# Patient Record
Sex: MALE | Race: OTHER | HISPANIC OR LATINO | URBAN - METROPOLITAN AREA
[De-identification: names, ages, dates, MRNs, and addresses within clinical notes are randomized per-mention and may not be internally consistent; named-entity substitution may affect disease eponyms.]

---

## 2019-04-29 ENCOUNTER — INPATIENT (INPATIENT)
Facility: HOSPITAL | Age: 34
LOS: 1 days | Discharge: HOME | DRG: 247 | End: 2019-05-01
Attending: INTERNAL MEDICINE | Admitting: INTERNAL MEDICINE
Payer: COMMERCIAL

## 2019-04-29 VITALS
OXYGEN SATURATION: 98 % | TEMPERATURE: 99 F | RESPIRATION RATE: 16 BRPM | HEART RATE: 106 BPM | SYSTOLIC BLOOD PRESSURE: 162 MMHG | HEIGHT: 70 IN | WEIGHT: 190.04 LBS | DIASTOLIC BLOOD PRESSURE: 103 MMHG

## 2019-04-29 DIAGNOSIS — R03.0 ELEVATED BLOOD-PRESSURE READING, WITHOUT DIAGNOSIS OF HYPERTENSION: ICD-10-CM

## 2019-04-29 DIAGNOSIS — R63.8 OTHER SYMPTOMS AND SIGNS CONCERNING FOOD AND FLUID INTAKE: ICD-10-CM

## 2019-04-29 DIAGNOSIS — F19.10 OTHER PSYCHOACTIVE SUBSTANCE ABUSE, UNCOMPLICATED: ICD-10-CM

## 2019-04-29 DIAGNOSIS — I21.4 NON-ST ELEVATION (NSTEMI) MYOCARDIAL INFARCTION: ICD-10-CM

## 2019-04-29 DIAGNOSIS — Z91.89 OTHER SPECIFIED PERSONAL RISK FACTORS, NOT ELSEWHERE CLASSIFIED: ICD-10-CM

## 2019-04-29 DIAGNOSIS — R07.9 CHEST PAIN, UNSPECIFIED: ICD-10-CM

## 2019-04-29 DIAGNOSIS — R00.0 TACHYCARDIA, UNSPECIFIED: ICD-10-CM

## 2019-04-29 DIAGNOSIS — Z29.9 ENCOUNTER FOR PROPHYLACTIC MEASURES, UNSPECIFIED: ICD-10-CM

## 2019-04-29 LAB
ALBUMIN SERPL ELPH-MCNC: 3.3 G/DL — LOW (ref 3.4–5)
ALP SERPL-CCNC: 37 U/L — LOW (ref 40–120)
ALT FLD-CCNC: 42 U/L — SIGNIFICANT CHANGE UP (ref 12–42)
ANION GAP SERPL CALC-SCNC: 11 MMOL/L — SIGNIFICANT CHANGE UP (ref 5–17)
ANION GAP SERPL CALC-SCNC: 5 MMOL/L — LOW (ref 9–16)
APTT BLD: 25.6 SEC — LOW (ref 27.5–36.3)
APTT BLD: 26.1 SEC — LOW (ref 27.5–36.3)
APTT BLD: 35.3 SEC — SIGNIFICANT CHANGE UP (ref 27.5–36.3)
APTT BLD: 84.3 SEC — HIGH (ref 27.5–36.3)
AST SERPL-CCNC: 38 U/L — HIGH (ref 15–37)
BASOPHILS NFR BLD AUTO: 0.5 % — SIGNIFICANT CHANGE UP (ref 0–2)
BILIRUB SERPL-MCNC: 0.2 MG/DL — SIGNIFICANT CHANGE UP (ref 0.2–1.2)
BUN SERPL-MCNC: 16 MG/DL — SIGNIFICANT CHANGE UP (ref 7–23)
BUN SERPL-MCNC: 21 MG/DL — SIGNIFICANT CHANGE UP (ref 7–23)
CALCIUM SERPL-MCNC: 8.6 MG/DL — SIGNIFICANT CHANGE UP (ref 8.5–10.5)
CALCIUM SERPL-MCNC: 9.1 MG/DL — SIGNIFICANT CHANGE UP (ref 8.4–10.5)
CHLORIDE SERPL-SCNC: 104 MMOL/L — SIGNIFICANT CHANGE UP (ref 96–108)
CHLORIDE SERPL-SCNC: 105 MMOL/L — SIGNIFICANT CHANGE UP (ref 96–108)
CHOLEST SERPL-MCNC: 155 MG/DL — SIGNIFICANT CHANGE UP (ref 10–199)
CK MB CFR SERPL CALC: 23.5 NG/ML — HIGH (ref 0–6.7)
CK MB CFR SERPL CALC: 31.1 NG/ML — HIGH (ref 0–6.7)
CK MB CFR SERPL CALC: 31.8 NG/ML — HIGH (ref 0–6.7)
CK SERPL-CCNC: 348 U/L — HIGH (ref 30–200)
CK SERPL-CCNC: 434 U/L — HIGH (ref 30–200)
CK SERPL-CCNC: 503 U/L — HIGH (ref 30–200)
CK SERPL-CCNC: 503 U/L — HIGH (ref 39–308)
CO2 SERPL-SCNC: 23 MMOL/L — SIGNIFICANT CHANGE UP (ref 22–31)
CO2 SERPL-SCNC: 27 MMOL/L — SIGNIFICANT CHANGE UP (ref 22–31)
CREAT SERPL-MCNC: 1.01 MG/DL — SIGNIFICANT CHANGE UP (ref 0.5–1.3)
CREAT SERPL-MCNC: 1.03 MG/DL — SIGNIFICANT CHANGE UP (ref 0.5–1.3)
D DIMER BLD IA.RAPID-MCNC: <150 NG/ML DDU — SIGNIFICANT CHANGE UP
EOSINOPHIL NFR BLD AUTO: 2 % — SIGNIFICANT CHANGE UP (ref 0–6)
GLUCOSE SERPL-MCNC: 123 MG/DL — HIGH (ref 70–99)
GLUCOSE SERPL-MCNC: 96 MG/DL — SIGNIFICANT CHANGE UP (ref 70–99)
HBA1C BLD-MCNC: 4.4 % — SIGNIFICANT CHANGE UP (ref 4–5.6)
HCT VFR BLD CALC: 51.1 % — HIGH (ref 39–50)
HCT VFR BLD CALC: 53.8 % — HIGH (ref 39–50)
HDLC SERPL-MCNC: 20 MG/DL — LOW
HGB BLD-MCNC: 17 G/DL — SIGNIFICANT CHANGE UP (ref 13–17)
HGB BLD-MCNC: 17.9 G/DL — HIGH (ref 13–17)
IMM GRANULOCYTES NFR BLD AUTO: 0.5 % — SIGNIFICANT CHANGE UP (ref 0–1.5)
INR BLD: 1.04 — SIGNIFICANT CHANGE UP (ref 0.88–1.16)
LIPID PNL WITH DIRECT LDL SERPL: 119 MG/DL — SIGNIFICANT CHANGE UP
LYMPHOCYTES # BLD AUTO: 18.7 % — SIGNIFICANT CHANGE UP (ref 13–44)
MAGNESIUM SERPL-MCNC: 2.2 MG/DL — SIGNIFICANT CHANGE UP (ref 1.6–2.6)
MCHC RBC-ENTMCNC: 29.9 PG — SIGNIFICANT CHANGE UP (ref 27–34)
MCHC RBC-ENTMCNC: 30.7 PG — SIGNIFICANT CHANGE UP (ref 27–34)
MCHC RBC-ENTMCNC: 33.3 G/DL — SIGNIFICANT CHANGE UP (ref 32–36)
MCHC RBC-ENTMCNC: 33.3 GM/DL — SIGNIFICANT CHANGE UP (ref 32–36)
MCV RBC AUTO: 90 FL — SIGNIFICANT CHANGE UP (ref 80–100)
MCV RBC AUTO: 92.3 FL — SIGNIFICANT CHANGE UP (ref 80–100)
MONOCYTES NFR BLD AUTO: 7.5 % — SIGNIFICANT CHANGE UP (ref 2–14)
NEUTROPHILS NFR BLD AUTO: 70.8 % — SIGNIFICANT CHANGE UP (ref 43–77)
NRBC # BLD: 0 /100 WBCS — SIGNIFICANT CHANGE UP (ref 0–0)
NT-PROBNP SERPL-SCNC: 31 PG/ML — SIGNIFICANT CHANGE UP (ref 0–300)
PCP SPEC-MCNC: SIGNIFICANT CHANGE UP
PLATELET # BLD AUTO: 369 K/UL — SIGNIFICANT CHANGE UP (ref 150–400)
PLATELET # BLD AUTO: 370 K/UL — SIGNIFICANT CHANGE UP (ref 150–400)
POTASSIUM SERPL-MCNC: 3.8 MMOL/L — SIGNIFICANT CHANGE UP (ref 3.5–5.3)
POTASSIUM SERPL-MCNC: 4.3 MMOL/L — SIGNIFICANT CHANGE UP (ref 3.5–5.3)
POTASSIUM SERPL-SCNC: 3.8 MMOL/L — SIGNIFICANT CHANGE UP (ref 3.5–5.3)
POTASSIUM SERPL-SCNC: 4.3 MMOL/L — SIGNIFICANT CHANGE UP (ref 3.5–5.3)
PROT SERPL-MCNC: 6.5 G/DL — SIGNIFICANT CHANGE UP (ref 6.4–8.2)
PROTHROM AB SERPL-ACNC: 11.4 SEC — SIGNIFICANT CHANGE UP (ref 10–12.9)
RBC # BLD: 5.68 M/UL — SIGNIFICANT CHANGE UP (ref 4.2–5.8)
RBC # BLD: 5.83 M/UL — HIGH (ref 4.2–5.8)
RBC # FLD: 13.4 % — SIGNIFICANT CHANGE UP (ref 10.3–14.5)
RBC # FLD: 13.5 % — SIGNIFICANT CHANGE UP (ref 10.3–14.5)
SODIUM SERPL-SCNC: 137 MMOL/L — SIGNIFICANT CHANGE UP (ref 132–145)
SODIUM SERPL-SCNC: 138 MMOL/L — SIGNIFICANT CHANGE UP (ref 135–145)
TOTAL CHOLESTEROL/HDL RATIO MEASUREMENT: 7.8 RATIO — SIGNIFICANT CHANGE UP (ref 3.4–9.6)
TRIGL SERPL-MCNC: 81 MG/DL — SIGNIFICANT CHANGE UP (ref 10–149)
TROPONIN I SERPL-MCNC: 0.72 NG/ML — CRITICAL HIGH (ref 0.02–0.06)
TROPONIN T SERPL-MCNC: 0.42 NG/ML — CRITICAL HIGH (ref 0–0.01)
TROPONIN T SERPL-MCNC: 0.44 NG/ML — CRITICAL HIGH (ref 0–0.01)
TROPONIN T SERPL-MCNC: 0.49 NG/ML — CRITICAL HIGH (ref 0–0.01)
TSH SERPL-MCNC: 1.23 UIU/ML — SIGNIFICANT CHANGE UP (ref 0.35–4.94)
WBC # BLD: 10.88 K/UL — HIGH (ref 3.8–10.5)
WBC # BLD: 12.1 K/UL — HIGH (ref 3.8–10.5)
WBC # FLD AUTO: 10.88 K/UL — HIGH (ref 3.8–10.5)
WBC # FLD AUTO: 12.1 K/UL — HIGH (ref 3.8–10.5)

## 2019-04-29 PROCEDURE — 93010 ELECTROCARDIOGRAM REPORT: CPT

## 2019-04-29 PROCEDURE — 93306 TTE W/DOPPLER COMPLETE: CPT | Mod: 26

## 2019-04-29 PROCEDURE — 75574 CT ANGIO HRT W/3D IMAGE: CPT | Mod: 26

## 2019-04-29 PROCEDURE — 93010 ELECTROCARDIOGRAM REPORT: CPT | Mod: NC,76

## 2019-04-29 PROCEDURE — 99285 EMERGENCY DEPT VISIT HI MDM: CPT | Mod: 25

## 2019-04-29 PROCEDURE — 71046 X-RAY EXAM CHEST 2 VIEWS: CPT | Mod: 26

## 2019-04-29 RX ORDER — HEPARIN SODIUM 5000 [USP'U]/ML
5300 INJECTION INTRAVENOUS; SUBCUTANEOUS EVERY 6 HOURS
Qty: 0 | Refills: 0 | Status: DISCONTINUED | OUTPATIENT
Start: 2019-04-29 | End: 2019-04-29

## 2019-04-29 RX ORDER — ASPIRIN/CALCIUM CARB/MAGNESIUM 324 MG
325 TABLET ORAL ONCE
Qty: 0 | Refills: 0 | Status: COMPLETED | OUTPATIENT
Start: 2019-04-29 | End: 2019-04-29

## 2019-04-29 RX ORDER — METOPROLOL TARTRATE 50 MG
12.5 TABLET ORAL
Qty: 0 | Refills: 0 | Status: DISCONTINUED | OUTPATIENT
Start: 2019-04-29 | End: 2019-05-01

## 2019-04-29 RX ORDER — ACETAMINOPHEN 500 MG
650 TABLET ORAL EVERY 6 HOURS
Qty: 0 | Refills: 0 | Status: DISCONTINUED | OUTPATIENT
Start: 2019-04-29 | End: 2019-05-01

## 2019-04-29 RX ORDER — ASPIRIN/CALCIUM CARB/MAGNESIUM 324 MG
81 TABLET ORAL EVERY 24 HOURS
Qty: 0 | Refills: 0 | Status: DISCONTINUED | OUTPATIENT
Start: 2019-04-29 | End: 2019-05-01

## 2019-04-29 RX ORDER — HEPARIN SODIUM 5000 [USP'U]/ML
1500 INJECTION INTRAVENOUS; SUBCUTANEOUS
Qty: 25000 | Refills: 0 | Status: DISCONTINUED | OUTPATIENT
Start: 2019-04-29 | End: 2019-04-29

## 2019-04-29 RX ORDER — HEPARIN SODIUM 5000 [USP'U]/ML
5000 INJECTION INTRAVENOUS; SUBCUTANEOUS ONCE
Qty: 0 | Refills: 0 | Status: COMPLETED | OUTPATIENT
Start: 2019-04-29 | End: 2019-04-29

## 2019-04-29 RX ORDER — HEPARIN SODIUM 5000 [USP'U]/ML
5300 INJECTION INTRAVENOUS; SUBCUTANEOUS ONCE
Qty: 0 | Refills: 0 | Status: COMPLETED | OUTPATIENT
Start: 2019-04-29 | End: 2019-04-29

## 2019-04-29 RX ORDER — METOPROLOL TARTRATE 50 MG
100 TABLET ORAL ONCE
Qty: 0 | Refills: 0 | Status: COMPLETED | OUTPATIENT
Start: 2019-04-29 | End: 2019-04-29

## 2019-04-29 RX ORDER — CLOPIDOGREL BISULFATE 75 MG/1
300 TABLET, FILM COATED ORAL ONCE
Qty: 0 | Refills: 0 | Status: COMPLETED | OUTPATIENT
Start: 2019-04-29 | End: 2019-04-29

## 2019-04-29 RX ORDER — ATORVASTATIN CALCIUM 80 MG/1
80 TABLET, FILM COATED ORAL AT BEDTIME
Qty: 0 | Refills: 0 | Status: DISCONTINUED | OUTPATIENT
Start: 2019-04-29 | End: 2019-05-01

## 2019-04-29 RX ORDER — HEPARIN SODIUM 5000 [USP'U]/ML
1400 INJECTION INTRAVENOUS; SUBCUTANEOUS
Qty: 25000 | Refills: 0 | Status: DISCONTINUED | OUTPATIENT
Start: 2019-04-29 | End: 2019-04-30

## 2019-04-29 RX ORDER — INFLUENZA VIRUS VACCINE 15; 15; 15; 15 UG/.5ML; UG/.5ML; UG/.5ML; UG/.5ML
0.5 SUSPENSION INTRAMUSCULAR ONCE
Qty: 0 | Refills: 0 | Status: COMPLETED | OUTPATIENT
Start: 2019-04-29 | End: 2019-04-29

## 2019-04-29 RX ORDER — CLOPIDOGREL BISULFATE 75 MG/1
75 TABLET, FILM COATED ORAL EVERY 24 HOURS
Qty: 0 | Refills: 0 | Status: DISCONTINUED | OUTPATIENT
Start: 2019-04-29 | End: 2019-05-01

## 2019-04-29 RX ORDER — HEPARIN SODIUM 5000 [USP'U]/ML
INJECTION INTRAVENOUS; SUBCUTANEOUS
Qty: 25000 | Refills: 0 | Status: DISCONTINUED | OUTPATIENT
Start: 2019-04-29 | End: 2019-04-29

## 2019-04-29 RX ORDER — CLOPIDOGREL BISULFATE 75 MG/1
75 TABLET, FILM COATED ORAL EVERY 24 HOURS
Qty: 0 | Refills: 0 | Status: DISCONTINUED | OUTPATIENT
Start: 2019-04-29 | End: 2019-04-29

## 2019-04-29 RX ORDER — HEPARIN SODIUM 5000 [USP'U]/ML
1250 INJECTION INTRAVENOUS; SUBCUTANEOUS
Qty: 25000 | Refills: 0 | Status: DISCONTINUED | OUTPATIENT
Start: 2019-04-29 | End: 2019-04-29

## 2019-04-29 RX ORDER — ALPRAZOLAM 0.25 MG
0.25 TABLET ORAL ONCE
Qty: 0 | Refills: 0 | Status: DISCONTINUED | OUTPATIENT
Start: 2019-04-29 | End: 2019-04-29

## 2019-04-29 RX ORDER — ASPIRIN/CALCIUM CARB/MAGNESIUM 324 MG
81 TABLET ORAL EVERY 24 HOURS
Qty: 0 | Refills: 0 | Status: DISCONTINUED | OUTPATIENT
Start: 2019-04-29 | End: 2019-04-29

## 2019-04-29 RX ADMIN — Medication 0.25 MILLIGRAM(S): at 16:31

## 2019-04-29 RX ADMIN — HEPARIN SODIUM 15 UNIT(S)/HR: 5000 INJECTION INTRAVENOUS; SUBCUTANEOUS at 19:50

## 2019-04-29 RX ADMIN — HEPARIN SODIUM 12.5 UNIT(S)/HR: 5000 INJECTION INTRAVENOUS; SUBCUTANEOUS at 11:43

## 2019-04-29 RX ADMIN — HEPARIN SODIUM 1000 UNIT(S)/HR: 5000 INJECTION INTRAVENOUS; SUBCUTANEOUS at 04:04

## 2019-04-29 RX ADMIN — CLOPIDOGREL BISULFATE 300 MILLIGRAM(S): 75 TABLET, FILM COATED ORAL at 04:01

## 2019-04-29 RX ADMIN — ATORVASTATIN CALCIUM 80 MILLIGRAM(S): 80 TABLET, FILM COATED ORAL at 21:38

## 2019-04-29 RX ADMIN — Medication 81 MILLIGRAM(S): at 11:34

## 2019-04-29 RX ADMIN — Medication 12.5 MILLIGRAM(S): at 18:22

## 2019-04-29 RX ADMIN — Medication 100 MILLIGRAM(S): at 13:10

## 2019-04-29 RX ADMIN — Medication 12.5 MILLIGRAM(S): at 11:34

## 2019-04-29 RX ADMIN — Medication 325 MILLIGRAM(S): at 03:14

## 2019-04-29 RX ADMIN — HEPARIN SODIUM 5300 UNIT(S): 5000 INJECTION INTRAVENOUS; SUBCUTANEOUS at 19:47

## 2019-04-29 RX ADMIN — HEPARIN SODIUM 5000 UNIT(S): 5000 INJECTION INTRAVENOUS; SUBCUTANEOUS at 04:03

## 2019-04-29 RX ADMIN — HEPARIN SODIUM 5300 UNIT(S): 5000 INJECTION INTRAVENOUS; SUBCUTANEOUS at 12:50

## 2019-04-29 RX ADMIN — CLOPIDOGREL BISULFATE 75 MILLIGRAM(S): 75 TABLET, FILM COATED ORAL at 11:34

## 2019-04-29 NOTE — H&P ADULT - NSHPLABSRESULTS_GEN_ALL_CORE
17.0   12.1  )-----------( 370      ( 29 Apr 2019 03:22 )             51.1       LIVER FUNCTIONS - ( 29 Apr 2019 03:22 )  Alb: 3.3 g/dL / Pro: 6.5 g/dL / ALK PHOS: 37 U/L / ALT: 42 U/L / AST: 38 U/L / GGT: x             All imaging reviewed

## 2019-04-29 NOTE — H&P ADULT - PROBLEM SELECTOR PLAN 2
-possibly 2/2 pain, anxiety, AE of steroids. no e/o HF on exam; clinically euvolemic. cannot exclude PE though without respiratory distress or calf pain.   -f/u d-dimer, TSH  -mgmt of NSTEMI as above  -orthostatics

## 2019-04-29 NOTE — H&P ADULT - NSICDXFAMILYHX_GEN_ALL_CORE_FT
FAMILY HISTORY:  Mother  Still living? Yes, Estimated age: Age Unknown  Family history of angina, Age at diagnosis: Age Unknown

## 2019-04-29 NOTE — ED PROVIDER NOTE - OBJECTIVE STATEMENT
Pt is a 32yo M with no PMH who p/w L sided CP that started around midnight tonight.  Pain is sharp in nature and reports radiation to LUE down to forearm.  Pain started while at rest and has been persistent.  Denies any changes with position or walking.  Associated with some SOB, described as having to take a deep breath intermittently and mild light headed sensation while walking here.  Pt reports similar sxs over the past 6 months.  Notes sxs after having intercourse but usually only lasts 2-3 minutes then goes away.    Meds - Truvada for PREP  Social - reports cycling steroids for the past 3 years and currently on testosterone tremblum for past 8 weeks.   [facility]P - [facility] Pt is a 32yo M with no PMH who p/w L sided CP that started around midnight tonight.  Pain is sharp in nature and reports radiation to LUE down to forearm.  Pain started while at rest and has been persistent.  Denies any changes with position or walking.  Associated with some SOB, described as having to take a deep breath intermittently and mild light headed sensation while walking here.  Pt reports similar sxs over the past 6 months.  Notes sxs after having intercourse but usually only lasts 2-3 minutes then goes away.  No recent URIs, no recent travel.   Meds - Truvada for PREP  Social - reports cycling steroids for the past 3 years and currently on testosterone tremblum for past 8 weeks.   PCP - [facility]

## 2019-04-29 NOTE — ED PROVIDER NOTE - PROGRESS NOTE DETAILS
Troponin elevated to 0.721.  Could be due to musculature however given story and sizable elevation will start on plavix and heparin and consult cardiology.  Case discussed with Dr. Ag who accepts pt to [facility].  Pt will have continued cardiac w/u now.

## 2019-04-29 NOTE — H&P ADULT - PROBLEM SELECTOR PLAN 4
F: none  E: replete prn  N: NPO -admits to recreational GHB as well as anabolic steroids  -SW c/s  -UTox

## 2019-04-29 NOTE — ED ADULT NURSE NOTE - NSIMPLEMENTINTERV_GEN_ALL_ED
Implemented All Universal Safety Interventions:  [place] to call system. Call bell, personal items and telephone within reach. Instruct patient to call for assistance. Room bathroom lighting operational. Non-slip footwear when patient is off stretcher. Physically safe environment: no spills, clutter or unnecessary equipment. Stretcher in lowest position, wheels locked, appropriate side rails in place.

## 2019-04-29 NOTE — H&P ADULT - PROBLEM SELECTOR PLAN 3
-admits to recreational GHB as well as anabolic steroids  -SW c/s  -UTox -no known history of HTN. Possibly 2/2 pain, steroids, essential HTN.   -trend   -Utox

## 2019-04-29 NOTE — PROGRESS NOTE ADULT - SUBJECTIVE AND OBJECTIVE BOX
Pt is having chest pain for exertion and sexual intercourse for the last several months    PAST MEDICAL & SURGICAL HISTORY:  Drug abuse  No significant past surgical history    Home Medications:  Truvada:  (29 Apr 2019 05:39)  Testosterone     MEDICATIONS  (STANDING):  aspirin enteric coated 81 milliGRAM(s) Oral every 24 hours  clopidogrel Tablet 75 milliGRAM(s) Oral every 24 hours  heparin  Infusion.  Unit(s)/Hr (10 mL/Hr) IV Continuous <Continuous>  influenza   Vaccine 0.5 milliLiter(s) IntraMuscular once    MEDICATIONS  (PRN):  acetaminophen   Tablet .. 650 milliGRAM(s) Oral every 6 hours PRN Moderate Pain (4 - 6)    ICU Vital Signs Last 24 Hrs  T(C): 37 (29 Apr 2019 09:20), Max: 37 (29 Apr 2019 02:38)  T(F): 98.6 (29 Apr 2019 09:20), Max: 98.6 (29 Apr 2019 02:38)  HR: 98 (29 Apr 2019 05:18) (96 - 106)  BP: 147/74 (29 Apr 2019 05:18) (141/72 - 168/87)  BP(mean): 108 (29 Apr 2019 05:18) (108 - 108)  ABP: --  ABP(mean): --  RR: 15 (29 Apr 2019 05:18) (15 - 18)  SpO2: 97% (29 Apr 2019 05:18) (97% - 99%)        Lungs clear  CV s1s 2  abd soft  ext stable    CARDIAC MARKERS ( 29 Apr 2019 04:08 )  x     / x     / 503 U/L / x     / x      CARDIAC MARKERS ( 29 Apr 2019 03:22 )  0.721 ng/mL / x     / x     / x     / x        Creatine Kinase, Serum (04.29.19 @ 04:08)    Creatine Kinase, Serum: 503 U/L    EKG NST  no acute change

## 2019-04-29 NOTE — ED PROVIDER NOTE - FAMILY HISTORY
Mother  Still living? Yes, Estimated age: Age Unknown  Family history of angina, Age at diagnosis: Age Unknown

## 2019-04-29 NOTE — H&P ADULT - NSHPPHYSICALEXAM_GEN_ALL_CORE
General:  NAD, nontoxic appearing, WDWN  HENT:  EOMI, PERRL.  No sinus tenderness.  oropharynx WNL. MMM   Neck:  Trachea midline.  No JVD, LAD, or thyromegaly.  Heart:  S1S2 no M/R/G, rrr (borderline tachy)  Lungs:  CTAB no wheezing, rhonchi or rales.  No accessory muscle use.  No respiratory distress.  Abdomen:  NABS.  soft, nontender, nondistended.  no guarding.  no ascites.  no organomegaly.  Vascular:  Peripheral pulses palpable  Extremities:  No edema  Back:  No CVA tenderness  Neuro:  AOx3, no facial asymmetry, nonfocal, no slurred speech  Skin:  diffuse nonblanching erythema to upper back.

## 2019-04-29 NOTE — ED PROVIDER NOTE - PHYSICAL EXAMINATION
VITAL SIGNS: I have reviewed nursing notes and confirm.  CONSTITUTIONAL: Well-developed muscular adult male; well-nourished; mildly anxious, intermittently sighing/taking in a deep breath.   SKIN: Skin is warm and dry, no acute rash.  HEAD: Normocephalic; atraumatic.  EYES: PERRL, EOM intact; conjunctiva and sclera clear.  ENT: No nasal discharge; airway clear.  NECK: Supple; non tender.  CARD: S1, S2 normal; no murmurs, gallops, or rubs. +tachycardia with regular rhythm.   RESP: No wheezes, rales or rhonchi.  ABD: Normal bowel sounds; soft; non-distended; non-tender; no hepatosplenomegaly.  MSK: Normal ROM. No clubbing, cyanosis or edema.  NEURO: Alert, oriented. Grossly unremarkable.  PSYCH: Cooperative, appropriate.

## 2019-04-29 NOTE — H&P ADULT - HISTORY OF PRESENT ILLNESS
Pt is a 33M, MSM, hx MRSA abscess of the buttock who uses anabolic steroids x 3 years who presented to the ED with c/o CP and SOB. Reports 6 mos of exertional L CP that is both sharp and dull in nature, radiating to the LUE. Symptoms usually come on with aggressive intercourse, and symptoms go away with rest. This has been ongoing for several mos. Today, symptoms began with masturbation and persisted longer than usual. Symptoms worsened prompting him to go to the ED. Pain reached 8/10. Reported associated lightheadedness and SOB with dry cough. No F/C, sore throat, congestion, recent travel, hx DVT/PE, calf pain or edema, abd pain, N/V/D/C, dysuria. Uses steroids IM. No recent recreational drug use; does occasionally use GHB (last used one month ago). No early cardiac dz in family. Works out regularly but doesn't recall pulling any muscles. Never smoked. Otherwise ROS neg.   In the ED, tachycardic to 106 and hypertensive to SBP 160s, otherwise VSS. Labs notable for WBC 12, albumin 3.3, trop I 0.72, . CXR clear. EKG NSR. He received , Plavix 300 and was started on a heparin drip.

## 2019-04-29 NOTE — ED ADULT TRIAGE NOTE - CHIEF COMPLAINT QUOTE
Patient presents to the ED complaining of sharp chest pain, intermittent since last night, radiating to L arm. + shortness of breath. + numbness of both arms

## 2019-04-29 NOTE — ED PROVIDER NOTE - CLINICAL SUMMARY MEDICAL DECISION MAKING FREE TEXT BOX
CP.  Steroid use and Fhx are only risk factors.  EKG WNL.  Will check labs including cardiac enzymes and CXR.  Will do serial trops and re-evaluate.

## 2019-04-30 LAB
ANION GAP SERPL CALC-SCNC: 8 MMOL/L — SIGNIFICANT CHANGE UP (ref 5–17)
APTT BLD: 43.4 SEC — HIGH (ref 27.5–36.3)
BUN SERPL-MCNC: 12 MG/DL — SIGNIFICANT CHANGE UP (ref 7–23)
CALCIUM SERPL-MCNC: 8.5 MG/DL — SIGNIFICANT CHANGE UP (ref 8.4–10.5)
CHLORIDE SERPL-SCNC: 103 MMOL/L — SIGNIFICANT CHANGE UP (ref 96–108)
CO2 SERPL-SCNC: 26 MMOL/L — SIGNIFICANT CHANGE UP (ref 22–31)
CREAT SERPL-MCNC: 0.99 MG/DL — SIGNIFICANT CHANGE UP (ref 0.5–1.3)
GLUCOSE SERPL-MCNC: 92 MG/DL — SIGNIFICANT CHANGE UP (ref 70–99)
HCT VFR BLD CALC: 54 % — HIGH (ref 39–50)
HGB BLD-MCNC: 17.7 G/DL — HIGH (ref 13–17)
INR BLD: 1.05 — SIGNIFICANT CHANGE UP (ref 0.88–1.16)
MCHC RBC-ENTMCNC: 30.6 PG — SIGNIFICANT CHANGE UP (ref 27–34)
MCHC RBC-ENTMCNC: 32.8 GM/DL — SIGNIFICANT CHANGE UP (ref 32–36)
MCV RBC AUTO: 93.3 FL — SIGNIFICANT CHANGE UP (ref 80–100)
NRBC # BLD: 0 /100 WBCS — SIGNIFICANT CHANGE UP (ref 0–0)
PLATELET # BLD AUTO: 339 K/UL — SIGNIFICANT CHANGE UP (ref 150–400)
POTASSIUM SERPL-MCNC: 4.1 MMOL/L — SIGNIFICANT CHANGE UP (ref 3.5–5.3)
POTASSIUM SERPL-SCNC: 4.1 MMOL/L — SIGNIFICANT CHANGE UP (ref 3.5–5.3)
PROTHROM AB SERPL-ACNC: 11.9 SEC — SIGNIFICANT CHANGE UP (ref 10–12.9)
RBC # BLD: 5.79 M/UL — SIGNIFICANT CHANGE UP (ref 4.2–5.8)
RBC # FLD: 13.5 % — SIGNIFICANT CHANGE UP (ref 10.3–14.5)
SODIUM SERPL-SCNC: 137 MMOL/L — SIGNIFICANT CHANGE UP (ref 135–145)
WBC # BLD: 9.34 K/UL — SIGNIFICANT CHANGE UP (ref 3.8–10.5)
WBC # FLD AUTO: 9.34 K/UL — SIGNIFICANT CHANGE UP (ref 3.8–10.5)

## 2019-04-30 PROCEDURE — 92978 ENDOLUMINL IVUS OCT C 1ST: CPT | Mod: 26

## 2019-04-30 PROCEDURE — 92928 PRQ TCAT PLMT NTRAC ST 1 LES: CPT | Mod: LD

## 2019-04-30 PROCEDURE — 93458 L HRT ARTERY/VENTRICLE ANGIO: CPT | Mod: 26,XU

## 2019-04-30 RX ORDER — ZOLPIDEM TARTRATE 10 MG/1
5 TABLET ORAL AT BEDTIME
Qty: 0 | Refills: 0 | Status: DISCONTINUED | OUTPATIENT
Start: 2019-04-30 | End: 2019-05-01

## 2019-04-30 RX ORDER — ASPIRIN/CALCIUM CARB/MAGNESIUM 324 MG
1 TABLET ORAL
Qty: 30 | Refills: 2 | OUTPATIENT
Start: 2019-04-30 | End: 2019-07-28

## 2019-04-30 RX ORDER — SODIUM CHLORIDE 9 MG/ML
500 INJECTION, SOLUTION INTRAVENOUS
Qty: 0 | Refills: 0 | Status: DISCONTINUED | OUTPATIENT
Start: 2019-04-30 | End: 2019-04-30

## 2019-04-30 RX ORDER — SODIUM CHLORIDE 9 MG/ML
500 INJECTION, SOLUTION INTRAVENOUS
Qty: 0 | Refills: 0 | Status: DISCONTINUED | OUTPATIENT
Start: 2019-04-30 | End: 2019-05-01

## 2019-04-30 RX ORDER — EMTRICITABINE AND TENOFOVIR DISOPROXIL FUMARATE 200; 300 MG/1; MG/1
0 TABLET, FILM COATED ORAL
Qty: 0 | Refills: 0 | COMMUNITY

## 2019-04-30 RX ORDER — CLOPIDOGREL BISULFATE 75 MG/1
1 TABLET, FILM COATED ORAL
Qty: 30 | Refills: 2 | OUTPATIENT
Start: 2019-04-30 | End: 2019-07-28

## 2019-04-30 RX ORDER — METOPROLOL TARTRATE 50 MG
1 TABLET ORAL
Qty: 30 | Refills: 0 | OUTPATIENT
Start: 2019-04-30 | End: 2019-05-29

## 2019-04-30 RX ORDER — ATORVASTATIN CALCIUM 80 MG/1
1 TABLET, FILM COATED ORAL
Qty: 30 | Refills: 0 | OUTPATIENT
Start: 2019-04-30 | End: 2019-05-29

## 2019-04-30 RX ADMIN — ATORVASTATIN CALCIUM 80 MILLIGRAM(S): 80 TABLET, FILM COATED ORAL at 21:17

## 2019-04-30 RX ADMIN — Medication 12.5 MILLIGRAM(S): at 18:07

## 2019-04-30 RX ADMIN — SODIUM CHLORIDE 75 MILLILITER(S): 9 INJECTION, SOLUTION INTRAVENOUS at 12:27

## 2019-04-30 RX ADMIN — Medication 12.5 MILLIGRAM(S): at 06:14

## 2019-04-30 RX ADMIN — ZOLPIDEM TARTRATE 5 MILLIGRAM(S): 10 TABLET ORAL at 23:43

## 2019-04-30 RX ADMIN — Medication 81 MILLIGRAM(S): at 08:52

## 2019-04-30 RX ADMIN — SODIUM CHLORIDE 50 MILLILITER(S): 9 INJECTION, SOLUTION INTRAVENOUS at 08:53

## 2019-04-30 RX ADMIN — CLOPIDOGREL BISULFATE 75 MILLIGRAM(S): 75 TABLET, FILM COATED ORAL at 08:52

## 2019-04-30 NOTE — PROGRESS NOTE ADULT - SUBJECTIVE AND OBJECTIVE BOX
OVERNIGHT EVENTS: pt was wanting to leave AMA yesterday evening and asking to schedule cardiac cath as an outpatient. Went to bedside and convinced patient to stay.     SUBJECTIVE / INTERVAL HPI: Patient seen and examined at bedside. Has no complaints this AM. Denies any CP, SOB, Palpitations, N/V, Diarrhea, F, or chills.     VITAL SIGNS:  Vital Signs Last 24 Hrs  T(C): 36.2 (30 Apr 2019 05:29), Max: 37.1 (29 Apr 2019 18:00)  T(F): 97.1 (30 Apr 2019 05:29), Max: 98.7 (29 Apr 2019 18:00)  HR: 84 (30 Apr 2019 13:02) (78 - 102)  BP: 129/71 (30 Apr 2019 13:02) (114/57 - 140/67)  BP(mean): 81 (30 Apr 2019 05:32) (81 - 102)  RR: 18 (30 Apr 2019 13:02) (16 - 19)  SpO2: 96% (30 Apr 2019 13:02) (96% - 98%)    PHYSICAL EXAM:    General: WDWN  HEENT: NC/AT; PERRL, anicteric sclera; MMM  Neck: supple  Cardiovascular: +S1/S2; RRR  Respiratory: CTA B/L; no W/R/R  Gastrointestinal: soft, NT/ND; +BSx4  Extremities: WWP; no edema, clubbing or cyanosis  Vascular: 2+ radial, DP/PT pulses B/L  Neurological: AAOx3; no focal deficits    MEDICATIONS:  MEDICATIONS  (STANDING):  aspirin enteric coated 81 milliGRAM(s) Oral every 24 hours  atorvastatin 80 milliGRAM(s) Oral at bedtime  clopidogrel Tablet 75 milliGRAM(s) Oral every 24 hours  influenza   Vaccine 0.5 milliLiter(s) IntraMuscular once  metoprolol tartrate 12.5 milliGRAM(s) Oral two times a day  sodium chloride 0.45%. 500 milliLiter(s) (75 mL/Hr) IV Continuous <Continuous>    MEDICATIONS  (PRN):  acetaminophen   Tablet .. 650 milliGRAM(s) Oral every 6 hours PRN Moderate Pain (4 - 6)      ALLERGIES:  Allergies    No Known Allergies    Intolerances        LABS:                        17.7   9.34  )-----------( 339      ( 30 Apr 2019 07:44 )             54.0     04-30    137  |  103  |  12  ----------------------------<  92  4.1   |  26  |  0.99    Ca    8.5      30 Apr 2019 07:44  Mg     2.2     04-29    TPro  6.5  /  Alb  3.3<L>  /  TBili  0.2  /  DBili  x   /  AST  38<H>  /  ALT  42  /  AlkPhos  37<L>  04-29    PT/INR - ( 30 Apr 2019 07:44 )   PT: 11.9 sec;   INR: 1.05          PTT - ( 30 Apr 2019 07:44 )  PTT:43.4 sec    CAPILLARY BLOOD GLUCOSE          RADIOLOGY & ADDITIONAL TESTS: Reviewed. OVERNIGHT EVENTS: pt was wanting to leave AMA yesterday evening and asking to schedule cardiac cath as an outpatient. Went to bedside and convinced patient to stay.     SUBJECTIVE / INTERVAL HPI: Patient seen and examined at bedside. Has no complaints this AM. Feels anxious and is uncomfortably with the temperature in the room. Denies any CP, SOB, Palpitations, N/V, Diarrhea, F, or chills.     VITAL SIGNS:  Vital Signs Last 24 Hrs  T(C): 36.2 (30 Apr 2019 05:29), Max: 37.1 (29 Apr 2019 18:00)  T(F): 97.1 (30 Apr 2019 05:29), Max: 98.7 (29 Apr 2019 18:00)  HR: 84 (30 Apr 2019 13:02) (78 - 102)  BP: 129/71 (30 Apr 2019 13:02) (114/57 - 140/67)  BP(mean): 81 (30 Apr 2019 05:32) (81 - 102)  RR: 18 (30 Apr 2019 13:02) (16 - 19)  SpO2: 96% (30 Apr 2019 13:02) (96% - 98%)    PHYSICAL EXAM:    General: WDWN  HEENT: NC/AT; PERRL, anicteric sclera; MMM  Neck: supple  Cardiovascular: +S1/S2; RRR  Respiratory: CTA B/L; no W/R/R  Gastrointestinal: soft, NT/ND; +BSx4  Extremities: WWP; no edema, clubbing or cyanosis  Vascular: 2+ radial, DP/PT pulses B/L  Neurological: AAOx3; no focal deficits    MEDICATIONS:  MEDICATIONS  (STANDING):  aspirin enteric coated 81 milliGRAM(s) Oral every 24 hours  atorvastatin 80 milliGRAM(s) Oral at bedtime  clopidogrel Tablet 75 milliGRAM(s) Oral every 24 hours  influenza   Vaccine 0.5 milliLiter(s) IntraMuscular once  metoprolol tartrate 12.5 milliGRAM(s) Oral two times a day  sodium chloride 0.45%. 500 milliLiter(s) (75 mL/Hr) IV Continuous <Continuous>    MEDICATIONS  (PRN):  acetaminophen   Tablet .. 650 milliGRAM(s) Oral every 6 hours PRN Moderate Pain (4 - 6)      ALLERGIES:  Allergies    No Known Allergies    Intolerances        LABS:                        17.7   9.34  )-----------( 339      ( 30 Apr 2019 07:44 )             54.0     04-30    137  |  103  |  12  ----------------------------<  92  4.1   |  26  |  0.99    Ca    8.5      30 Apr 2019 07:44  Mg     2.2     04-29    TPro  6.5  /  Alb  3.3<L>  /  TBili  0.2  /  DBili  x   /  AST  38<H>  /  ALT  42  /  AlkPhos  37<L>  04-29    PT/INR - ( 30 Apr 2019 07:44 )   PT: 11.9 sec;   INR: 1.05          PTT - ( 30 Apr 2019 07:44 )  PTT:43.4 sec    CAPILLARY BLOOD GLUCOSE          RADIOLOGY & ADDITIONAL TESTS: Reviewed.

## 2019-04-30 NOTE — PROGRESS NOTE ADULT - SUBJECTIVE AND OBJECTIVE BOX
Pt is pain free this am     s/p CTA yesterday  revealing   4.3 cm  aortic root aneurysm  Calcium score 0  Moderate to severe stenosis   Prox LAD   LAD normal elsewhere   Minimla Prox RCA plaque  L cx  normal     PAST MEDICAL & SURGICAL HISTORY:  Drug abuse  No significant past surgical history    ICU Vital Signs Last 24 Hrs  T(C): 36.2 (30 Apr 2019 05:29), Max: 37.1 (29 Apr 2019 18:00)  T(F): 97.1 (30 Apr 2019 05:29), Max: 98.7 (29 Apr 2019 18:00)  HR: 78 (30 Apr 2019 05:32) (78 - 108)  BP: 116/69 (30 Apr 2019 05:32) (114/57 - 150/66)  BP(mean): 81 (30 Apr 2019 05:32) (81 - 104)  ABP: --  ABP(mean): --  RR: 19 (30 Apr 2019 05:32) (16 - 19)  SpO2: 96% (30 Apr 2019 05:32) (96% - 98%)    Lungs clear  CV s1 s2   abd soft  Ext stable    Echo     Hypokinesis   RV apex    LVF  30 -35 %   severe global hypokinesis                            17.7   9.34  )-----------( 339      ( 30 Apr 2019 07:44 )             54.0   04-30    137  |  103  |  12  ----------------------------<  92  4.1   |  26  |  0.99    Ca    8.5      30 Apr 2019 07:44  Mg     2.2     04-29    TPro  6.5  /  Alb  3.3<L>  /  TBili  0.2  /  DBili  x   /  AST  38<H>  /  ALT  42  /  AlkPhos  37<L>  04-29  CARDIAC MARKERS ( 29 Apr 2019 22:54 )  x     / 0.42 ng/mL / 348 U/L / x     / 23.5 ng/mL  CARDIAC MARKERS ( 29 Apr 2019 18:20 )  x     / 0.49 ng/mL / 434 U/L / x     / 31.1 ng/mL  CARDIAC MARKERS ( 29 Apr 2019 10:40 )  x     / 0.44 ng/mL / 503 U/L / x     / 31.8 ng/mL  CARDIAC MARKERS ( 29 Apr 2019 04:08 )  x     / x     / 503 U/L / x     / x      CARDIAC MARKERS ( 29 Apr 2019 03:22 )  0.721 ng/mL / x     / x     / x     / x

## 2019-04-30 NOTE — PROGRESS NOTE ADULT - PROBLEM SELECTOR PLAN 1
-CP, abnormal EKG and positive troponin  -C/w ASA, plavix, hep gtt  -TTE with global hypokineses and EF 30-35%  - CT coronary with mild-moderate stenosis of prox LAD, with Ca score of 0   -a1c, lipids, tsh wnl  - cardiac cath today

## 2019-04-30 NOTE — CHART NOTE - NSCHARTNOTEFT_GEN_A_CORE
The patient wishes to leave against medical advice.  I have discussed the risks, benefits and alternatives (including the possibility of worsening of disease, pain, permanent disability, and/or death) with the patient and his family (at bedside).  The patient voices understanding of these risks, benefits, and alternatives and still wishes to sign out against medical advice.  The patient is awake, alert, oriented  x 3 and has demonstrated capacity to refuse/direct care.  I have advised the patient that they can and should return immediately should they develop any worse/different/additional symptoms, or if they change their mind and want to continue their care. Patient is refusing to sign AMA paperwork.

## 2019-04-30 NOTE — DISCHARGE NOTE PROVIDER - NSDCCPCAREPLAN_GEN_ALL_CORE_FT
PRINCIPAL DISCHARGE DIAGNOSIS  Diagnosis: NSTEMI (non-ST elevated myocardial infarction)  Assessment and Plan of Treatment: You were admitted to the hospital because you were having chest pain. You were found to have an NSTEMI. NSTEMI is a type of heart attack. NSTEMI stands for Non-ST-elevation myocardial infarction. Sometimes an NSTEMI is known as a non-STEMI. A myocardial infarction is the medical term for a heart attack. ST refers to the ST segment, which is part of the EKG heart tracing used to diagnose a heart attack. You had a cardiac cath done and had a stent placed in one of the main artery of your heart (LAD). Please continue to take your Aspirin and Plavix as directed and follow up with a cardiologist in 2-3 weeks, PRINCIPAL DISCHARGE DIAGNOSIS  Diagnosis: NSTEMI (non-ST elevated myocardial infarction)  Assessment and Plan of Treatment: You were admitted to the hospital because you were having chest pain. You were found to have an NSTEMI. NSTEMI is a type of heart attack. NSTEMI stands for Non-ST-elevation myocardial infarction. Sometimes an NSTEMI is known as a non-STEMI. A myocardial infarction is the medical term for a heart attack. ST refers to the ST segment, which is part of the EKG heart tracing used to diagnose a heart attack. You had a cardiac cath done and had a stent placed in one of the main artery of your heart (LAD). Please continue to take your Aspirin and Plavix as directed and follow up with a cardiologist in 2-3 weeks,      SECONDARY DISCHARGE DIAGNOSES  Diagnosis: HFrEF (heart failure with reduced ejection fraction)  Assessment and Plan of Treatment: During this hospital admission you had an echocardiogram done which demonstrated you had a reduced pump function (30-35%) of your heart called Heart Failure. This is condition in which the heart doesn't pump blood as well as it should. Heart failure can occur if the heart cannot pump (systolic) or fill (diastolic) adequately. Symptoms include shortness of breath, fatigue, swollen legs, and rapid heartbeat. Please continue taking your medications as directed and follow up with your cardiologist in 2-3 week.s PRINCIPAL DISCHARGE DIAGNOSIS  Diagnosis: NSTEMI (non-ST elevated myocardial infarction)  Assessment and Plan of Treatment: You were admitted to the hospital because you were having chest pain. You were found to have an NSTEMI. NSTEMI is a type of heart attack. NSTEMI stands for Non-ST-elevation myocardial infarction. Sometimes an NSTEMI is known as a non-STEMI. A myocardial infarction is the medical term for a heart attack. ST refers to the ST segment, which is part of the EKG heart tracing used to diagnose a heart attack. You had a cardiac cath done and had a stent placed in one of the main artery of your heart (LAD). Please continue to take your Aspirin, Plavix, and Lipitor as directed and follow up with a cardiologist in 2-3 weeks,      SECONDARY DISCHARGE DIAGNOSES  Diagnosis: HFrEF (heart failure with reduced ejection fraction)  Assessment and Plan of Treatment: During this hospital admission you had an echocardiogram done which demonstrated you had a reduced pump function (30-35%) of your heart called Heart Failure. This is condition in which the heart doesn't pump blood as well as it should. Heart failure can occur if the heart cannot pump (systolic) or fill (diastolic) adequately. Symptoms include shortness of breath, fatigue, swollen legs, and rapid heartbeat. Please continue taking your medications as directed and follow up with your cardiologist in 2-3 weeks. You will be started on Lisinopril 2.5mg and Toprol 25mg daily and should continue to take these medications.

## 2019-04-30 NOTE — DISCHARGE NOTE PROVIDER - NSDCFUADDINST_GEN_ALL_CORE_FT
Increase activity as tolerated. Try to avoid heavy lifting and straining for the first 1-2 weeks after having a stent placed.

## 2019-04-30 NOTE — PROGRESS NOTE ADULT - PROBLEM SELECTOR PLAN 2
-possibly 2/2 pain, anxiety, AE of steroids. no e/o HF on exam; clinically euvolemic. cannot exclude PE though without respiratory distress or calf pain.   -mgmt of NSTEMI as above

## 2019-04-30 NOTE — PROGRESS NOTE ADULT - SUBJECTIVE AND OBJECTIVE BOX
Interventional Cardiology PA Pre-Cath Note    HPI:  34 y/o Male, MSM (on Truvada for Prep), h/o anabolic steroid use x 3 years, occasional GHB use (utox negative), MRSA abscess of the buttock, and newly diagnosed Systolic Heart Failure (EF 30-35% by Echo) who presented to [facility] ED 4/29 with c/o 8/10 intermittent sharp Left sided chest pain with radiation to Left arm and associated bilateral upper extremity parasthesias, lightheadedness, and shortness of breath x 1 day. Patient endorsed 6 months of exertional chest pain with sexual intercourse with radiation to Left upper extremity relieved with rest. In ED, patient was noted to be hypertensive and tachycardic with ishemic EKG and elevated Troponin I 0.721, ruled in NSTEMI, received Plavix 300mg and Aspirin 325mg and was started on a Heparin gtt and transferred to [facility]. Trop T trended 0.44>0.49>0.42. Echocardiogram 11/29/2019: EF: 30-35%, severe global hypokinesis of LV with mild dilation, hypokinesis of the RV apex, mild AV thickening, trace MR/TR, Aortic Root measures 2.2 cm/m2 and 4.4 cm at sinuses (normal less than 2.1 and 3.6 for women).     Allergies: NKDA, NKFA. Denies Shellfish/Contrast dye allergy.    Active Medications:  heparin  Infusion 1400 Unit(s)/Hr IV Continuous <Continuous>  clopidogrel Tablet 75 milliGRAM(s) Oral every 24 hours  aspirin enteric coated 81 milliGRAM(s) Oral every 24 hours  metoprolol tartrate 12.5 milliGRAM(s) Oral two times a day  atorvastatin 80 milliGRAM(s) Oral at bedtime  acetaminophen   Tablet .. 650 milliGRAM(s) Oral every 6 hours PRN    Social History: MSM (on Truvada for Prep), anabolic steroid use x 3 years, occasional GHB use (utox negative), denies Smoking and ETOH abuse.    Surgical History: Denies    O:  Physical Exam:    T(F): 97.1 (04-30-19 @ 05:29), Max: 98.7 (04-29-19 @ 18:00)  HR: 78 (04-30-19 @ 05:32) (78 - 108)  BP: 116/69 (04-30-19 @ 05:32) (114/57 - 150/66)  RR: 19 (04-30-19 @ 05:32) (16 - 19)  SpO2: 96% (04-30-19 @ 05:32) (96% - 98%)  HEENT: NCAT, EOMI, PERRLA  NECK: No JVD, No carotid bruits B/L, +2 Carotid pulses B/L  PULM:  CTA B/L No W/R/R  CARD: RRR, +S1, +S2, No M/R/G  ABD: ND, +BS, NT, no masses  EXT: Warm, pedal edema yes/no, pitting/non-pitting  RECTAL: Guaiac negative/positive   NEURO: A & O x 3, no focal neurologic deficits  PULSES:	   B	       R	                FEM         	                                 DP/PT  Right		                                Yes/No     Bruit	    Left		                                        Yes/No     Bruit    Labs:        A/P:          H/H /, patient denies bleeding, GI bleeding, hematemesis, hematuria, BRBPR, and melena. Patient received Plavix 300mg and Aspirin 325mg in ED and has been receiving daily Plavix 75mg and Aspirin 81mg while in house with last dose this morning.    Cr. , euvolemic on exam with lungs clear and no pedal edema noted, IV hydration 9% NS @ 75cc/hr ordered pre-Cath.    ASA Class III    Mallampati Class III    Risks & benefits of procedure and alternative therapy have been explained to the patient including but not limited to: allergic reaction, bleeding with possible need for blood transfusion, infection, renal and vascular compromise, limb damage, arrhythmia, stroke, vessel dissection/perforation, Myocardial Infarction, emergent CABG. Informed consent obtained and in chart.    Patient a candidate for sedation: Yes    Sedation Type: Moderate Interventional Cardiology PA Pre-Cath Note    HPI:  32 y/o Male, MSM (on Truvada for Prep), h/o anabolic steroid use x 3 years, occasional GHB use (utox negative), MRSA abscess of the buttock, and newly diagnosed Systolic Heart Failure (EF 30-35% by Echo) who presented to [facility] ED 4/29 with c/o 8/10 intermittent sharp Left sided chest pain with radiation to Left arm and associated bilateral upper extremity parasthesias, lightheadedness, and shortness of breath x 1 day. Patient endorsed 6 months of exertional chest pain with sexual intercourse with radiation to Left upper extremity relieved with rest. In ED, patient was noted to be hypertensive and tachycardic with ishemic EKG and elevated Troponin I 0.721, ruled in NSTEMI, received Plavix 300mg and Aspirin 325mg and was started on a Heparin gtt and transferred to [facility]. Trop T trended 0.44>0.49>0.42. 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 Patient was admitted to [facility] for further management, Metoprolol Tartrate and Atorvastatin were initiated.    Allergies: NKDA, NKFA. Denies Shellfish/Contrast dye allergy.    Active Medications:  heparin  Infusion 1400 Unit(s)/Hr IV Continuous <Continuous>  clopidogrel Tablet 75 milliGRAM(s) Oral every 24 hours  aspirin enteric coated 81 milliGRAM(s) Oral every 24 hours  metoprolol tartrate 12.5 milliGRAM(s) Oral two times a day  atorvastatin 80 milliGRAM(s) Oral at bedtime  acetaminophen   Tablet .. 650 milliGRAM(s) Oral every 6 hours PRN    Social History: MSM (on Truvada for Prep), anabolic steroid use x 3 years, occasional GHB use (utox negative), denies Smoking and ETOH abuse.    Surgical History: Denies    O:  Physical Exam:    T(F): 97.1 (04-30-19 @ 05:29), Max: 98.7 (04-29-19 @ 18:00)  HR: 78 (04-30-19 @ 05:32) (78 - 108)  BP: 116/69 (04-30-19 @ 05:32) (114/57 - 150/66)  RR: 19 (04-30-19 @ 05:32) (16 - 19)  SpO2: 96% (04-30-19 @ 05:32) (96% - 98%)  HEENT: NCAT, EOMI, PERRLA  NECK: No JVD, No carotid bruits B/L, +2 Carotid pulses B/L  PULM:  CTA B/L No W/R/R  CARD: RRR, +S1, +S2, No M/R/G  ABD: ND, +BS, NT, no masses  EXT: Warm, pedal edema yes/no, pitting/non-pitting  RECTAL: Guaiac negative/positive   NEURO: A & O x 3, no focal neurologic deficits  PULSES:	   B	       R	                FEM         	                                 DP/PT  Right		                                Yes/No     Bruit	    Left		                                        Yes/No     Bruit    Labs:        A/P:          H/H /, patient denies bleeding, GI bleeding, hematemesis, hematuria, BRBPR, and melena. Patient received Plavix 300mg and Aspirin 325mg in ED and has been receiving daily Plavix 75mg and Aspirin 81mg while in house with last dose this morning.    Cr. , euvolemic on exam with lungs clear and no pedal edema noted, IV hydration 9% NS @ 75cc/hr ordered pre-Cath.    ASA Class III    Mallampati Class III    Risks & benefits of procedure and alternative therapy have been explained to the patient including but not limited to: allergic reaction, bleeding with possible need for blood transfusion, infection, renal and vascular compromise, limb damage, arrhythmia, stroke, vessel dissection/perforation, Myocardial Infarction, emergent CABG. Informed consent obtained and in chart.    Patient a candidate for sedation: Yes    Sedation Type: Moderate Interventional Cardiology PA Pre-Cath Note    HPI:  34 y/o Male, MSM (on Truvada for Prep), with h/o anabolic steroid and Testosterone use x 3 years, occasional GHB use (utox negative), newly diagnosed Systolic Heart Failure (EF 30-35% by Echo), and h/o MRSA Cellulitis/abscess of the buttock several years ago who presented to [facility] ED 4/29 with c/o 8/10 intermittent sharp Left sided chest pain with radiation to Left arm and associated bilateral upper extremity parasthesias, lightheadedness, and shortness of breath x 1 day. Patient endorsed 6 months of exertional chest pain with sexual intercourse with rest. In ED, patient was noted to be hypertensive and tachycardic with ishemic EKG and elevated Troponin I 0.721, ruled in NSTEMI, received Plavix 300mg and Aspirin 325mg and was started on a Heparin gtt and transferred to [facility]. Trop T trended 0.44>0.49>0.42. 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 Patient was admitted to [facility] for further management, Metoprolol Tartrate and Atorvastatin were initiated. This morning patient denies active chest pain, palpitations, shortness of breath, diaphoresis, fatigue, dizziness, syncope, lower extremity edema, orthopnea, positional nocturnal dyspnea, recent fever, chills, night sweats, cough, nausea, vomiting, and diarrhea. In light of patient's risk factors, NSTEMI, and abnormal CTA Coronaries and Echocardiogram, patient is now referred to [facility] for Cardiac Catheterization with possible intervention if clinically indicated.    S:  Allergies: NKDA, NKFA, denies Shellfish/Contrast dye allergy.    Active Medications:  heparin  Infusion 1400 Unit(s)/Hr IV Continuous <Continuous>  clopidogrel Tablet 75 milliGRAM(s) Oral every 24 hours  aspirin enteric coated 81 milliGRAM(s) Oral every 24 hours  metoprolol tartrate 12.5 milliGRAM(s) Oral two times a day  atorvastatin 80 milliGRAM(s) Oral at bedtime  acetaminophen Tablet .. 650 milliGRAM(s) Oral every 6 hours PRN    Surgical History: Cleft Plate repair (1985)    Social History: MSM (on Truvada for Prep), anabolic steroid use x 3 years, occasional GHB use (utox negative), denies Smoking and ETOH abuse.    O:  Physical Exam:  T(C): 36.2 (30 Apr 2019 05:29), Max: 37.1 (29 Apr 2019 18:00)  T(F): 97.1 (30 Apr 2019 05:29), Max: 98.7 (29 Apr 2019 18:00)  HR: 78 (30 Apr 2019 05:32) (78 - 108)  BP: 116/69 (30 Apr 2019 05:32) (114/57 - 150/66)  BP(mean): 81 (30 Apr 2019 05:32) (81 - 104)  RR: 19 (30 Apr 2019 05:32) (16 - 19)  SpO2: 96% (30 Apr 2019 05:32) (96% - 98%)    HEENT: NCAT, EOMI, ANDREW  NECK: No JVD, No carotid bruits B/L, +2 Carotid pulses B/L  PULM: CTA B/L No W/R/R  CARD: RRR, +S1, +S2, No M/R/G  ABD: ND, +BS, NT, no masses  EXT: Warm, no pedal edema  NEURO: A & O x 3, no focal neurologic deficits  PULSES: 2+ Radial, Femoral, DP/PT bilaterally  No femoral bruits bilaterally    Labs:             17.7   9.34  )-----------( 339      ( 30 Apr 2019 07:44 )             54.0     137  |  103  |  12  ----------------------------<  92  4.1   |  26  |  0.99    Ca    8.5      30 Apr 2019 07:44    Mg     2.2     04-29    TPro  6.5  /  Alb  3.3<L>  /  TBili  0.2  /  DBili  x   /  AST  38<H>  /  ALT  42  /  AlkPhos  37<L>  04-29    PT/INR - ( 30 Apr 2019 07:44 )   PT: 11.9 sec;   INR: 1.05        PTT - ( 30 Apr 2019 07:44 )  PTT:43.4 sec    CARDIAC MARKERS ( 29 Apr 2019 22:54 )  x     / 0.42 ng/mL / 348 U/L / x     / 23.5 ng/mL  CARDIAC MARKERS ( 29 Apr 2019 18:20 )  x     / 0.49 ng/mL / 434 U/L / x     / 31.1 ng/mL  CARDIAC MARKERS ( 29 Apr 2019 10:40 )  x     / 0.44 ng/mL / 503 U/L / x     / 31.8 ng/mL  CARDIAC MARKERS ( 29 Apr 2019 04:08 )  x     / x     / 503 U/L / x     / x      CARDIAC MARKERS ( 29 Apr 2019 03:22 )  0.721 ng/mL / x     / x     / x     / x        Hemoglobin A1C, Whole Blood: 4.4 % (04-29-19 @ 10:40)    Thyroid Stimulating Hormone, Serum: 1.233 uIU/mL (04-29-19 @ 10:40)

## 2019-05-01 VITALS — WEIGHT: 159.17 LBS

## 2019-05-01 LAB
ANION GAP SERPL CALC-SCNC: 10 MMOL/L — SIGNIFICANT CHANGE UP (ref 5–17)
BUN SERPL-MCNC: 13 MG/DL — SIGNIFICANT CHANGE UP (ref 7–23)
CALCIUM SERPL-MCNC: 8.8 MG/DL — SIGNIFICANT CHANGE UP (ref 8.4–10.5)
CHLORIDE SERPL-SCNC: 102 MMOL/L — SIGNIFICANT CHANGE UP (ref 96–108)
CO2 SERPL-SCNC: 22 MMOL/L — SIGNIFICANT CHANGE UP (ref 22–31)
CREAT SERPL-MCNC: 1.03 MG/DL — SIGNIFICANT CHANGE UP (ref 0.5–1.3)
GLUCOSE SERPL-MCNC: 81 MG/DL — SIGNIFICANT CHANGE UP (ref 70–99)
HCT VFR BLD CALC: 55.9 % — HIGH (ref 39–50)
HGB BLD-MCNC: 18.1 G/DL — HIGH (ref 13–17)
MCHC RBC-ENTMCNC: 30.4 PG — SIGNIFICANT CHANGE UP (ref 27–34)
MCHC RBC-ENTMCNC: 32.4 GM/DL — SIGNIFICANT CHANGE UP (ref 32–36)
MCV RBC AUTO: 93.9 FL — SIGNIFICANT CHANGE UP (ref 80–100)
NRBC # BLD: 0 /100 WBCS — SIGNIFICANT CHANGE UP (ref 0–0)
PLATELET # BLD AUTO: 341 K/UL — SIGNIFICANT CHANGE UP (ref 150–400)
POTASSIUM SERPL-MCNC: 4.6 MMOL/L — SIGNIFICANT CHANGE UP (ref 3.5–5.3)
POTASSIUM SERPL-SCNC: 4.6 MMOL/L — SIGNIFICANT CHANGE UP (ref 3.5–5.3)
RBC # BLD: 5.95 M/UL — HIGH (ref 4.2–5.8)
RBC # FLD: 13.4 % — SIGNIFICANT CHANGE UP (ref 10.3–14.5)
SODIUM SERPL-SCNC: 134 MMOL/L — LOW (ref 135–145)
WBC # BLD: 10.45 K/UL — SIGNIFICANT CHANGE UP (ref 3.8–10.5)
WBC # FLD AUTO: 10.45 K/UL — SIGNIFICANT CHANGE UP (ref 3.8–10.5)

## 2019-05-01 PROCEDURE — 99232 SBSQ HOSP IP/OBS MODERATE 35: CPT | Mod: 25

## 2019-05-01 RX ORDER — LISINOPRIL 2.5 MG/1
2.5 TABLET ORAL DAILY
Qty: 0 | Refills: 0 | Status: DISCONTINUED | OUTPATIENT
Start: 2019-05-01 | End: 2019-05-01

## 2019-05-01 RX ORDER — LISINOPRIL 2.5 MG/1
1 TABLET ORAL
Qty: 30 | Refills: 0 | OUTPATIENT
Start: 2019-05-01 | End: 2019-05-30

## 2019-05-01 RX ADMIN — LISINOPRIL 2.5 MILLIGRAM(S): 2.5 TABLET ORAL at 09:14

## 2019-05-01 RX ADMIN — Medication 12.5 MILLIGRAM(S): at 06:02

## 2019-05-01 RX ADMIN — Medication 81 MILLIGRAM(S): at 09:14

## 2019-05-01 RX ADMIN — CLOPIDOGREL BISULFATE 75 MILLIGRAM(S): 75 TABLET, FILM COATED ORAL at 09:14

## 2019-05-01 NOTE — PROGRESS NOTE ADULT - SUBJECTIVE AND OBJECTIVE BOX
INTERVENTIONAL CARDIOLOGY FOLLOW UP NOTE    -Patient seen and examined this am  -No events overnight  -No complaints this am    VASCULAR ACCESS EXAM:     RADIAL:   2+ right radial pulse   Normal capillary refill. No evidence of motor or sensory deficits of digits, hand, wrist or forearm.   -Access site clean, non-tender, without ecchymosis or hematoma.    A/P  32 yo M with PMH CHF now s/p PCI of Proximal LAD with RICHY via radial approach with no evidence of vascular complications post procedure.     -continue with current medications including dual antiplatelet therapy   -discharge instructions as per protocol   -outpatient follow up with primary cardiologist

## 2019-05-01 NOTE — PROGRESS NOTE ADULT - SUBJECTIVE AND OBJECTIVE BOX
Pt is s/p PTCA with stent to LAD     doing well post Procedure      s/p Cardiomyopathy   EF  30-35 %     PAST MEDICAL & SURGICAL HISTORY:  Drug abuse  No significant past surgical history      MEDICATIONS  (STANDING):  aspirin enteric coated 81 milliGRAM(s) Oral every 24 hours  atorvastatin 80 milliGRAM(s) Oral at bedtime  clopidogrel Tablet 75 milliGRAM(s) Oral every 24 hours  lisinopril 2.5 milliGRAM(s) Oral daily  metoprolol tartrate 12.5 milliGRAM(s) Oral two times a day    MEDICATIONS  (PRN):  acetaminophen   Tablet .. 650 milliGRAM(s) Oral every 6 hours PRN Moderate Pain (4 - 6)  zolpidem 5 milliGRAM(s) Oral at bedtime PRN Insomnia       ICU Vital Signs Last 24 Hrs  T(C): 36.9 (01 May 2019 05:43), Max: 37 (30 Apr 2019 21:48)  T(F): 98.4 (01 May 2019 05:43), Max: 98.6 (30 Apr 2019 21:48)  HR: 74 (01 May 2019 05:05) (74 - 100)  BP: 100/52 (01 May 2019 05:05) (100/52 - 140/70)  BP(mean): 78 (01 May 2019 05:05) (78 - 98)  ABP: --  ABP(mean): --  RR: 18 (01 May 2019 05:05) (17 - 18)  SpO2: 96% (01 May 2019 05:05) (96% - 97%)    Lungs clear  CV s1 s2   abd soft  Ext stable                          18.1   10.45 )-----------( 341      ( 01 May 2019 08:23 )             55.9   04-30    137  |  103  |  12  ----------------------------<  92  4.1   |  26  |  0.99    Ca    8.5      30 Apr 2019 07:44  Mg     2.2     04-29        CARDIAC MARKERS ( 29 Apr 2019 22:54 )  x     / 0.42 ng/mL / 348 U/L / x     / 23.5 ng/mL  CARDIAC MARKERS ( 29 Apr 2019 18:20 )  x     / 0.49 ng/mL / 434 U/L / x     / 31.1 ng/mL  CARDIAC MARKERS ( 29 Apr 2019 10:40 )  x     / 0.44 ng/mL / 503 U/L / x     / 31.8 ng/mL    Lipid Profile (04.29.19 @ 10:40)    Total Cholesterol/HDL Ratio Measurement: 7.8 RATIO    Cholesterol, Serum: 155 mg/dL    HDL Cholesterol, Serum: 20: HDL Levels >/= 60 mg/dL are considered beneficial and a "negative" risk  factor.  Effective 08/15/2018: New reference range and interpretive comment. mg/dL

## 2019-05-01 NOTE — DISCHARGE NOTE NURSING/CASE MANAGEMENT/SOCIAL WORK - NSDCPEPT PROEDHF_GEN_ALL_CORE
Low salt diet/Report signs and symptoms to primary care provider/Monitor weight daily/Activities as tolerated/Call primary care provider for follow up after discharge

## 2019-05-01 NOTE — DISCHARGE NOTE NURSING/CASE MANAGEMENT/SOCIAL WORK - NSDCDPATPORTLINK_GEN_ALL_CORE
You can access the [facility] Patient Portal, offered by [facility], by registering with the following website: [email or web address]

## 2019-05-01 NOTE — PROGRESS NOTE ADULT - ASSESSMENT
CAD  cardiomyopathy  PTCA    stent to LAD    Cont med Rx
r/o ACS       for CTA Coronaries       BRITTANEY Ag
Cardiomyopathy   LAD stenosis suspected on CTA     for cardiac Cath
33M with recreational polysubstance and anabolic steroid use admitted for r/o ACS
32 y/o Male with newly diagnosed Systolic Heart Failure (EF 30-35% by Echo) who initially presented to [facility] ED 4/29 with c/o 8/10 intermittent sharp Left sided chest pain with radiation to Left arm, ruled in NSTEMI and is now referred for Cardiac Catheterization with possible intervention if clinically indicated.    H/H 17.7/54, patient denies bleeding, GI bleeding, hematemesis, hematuria, BRBPR, and melena. Patient received Plavix 300mg and Aspirin 325mg in ED and has been receiving daily Plavix 75mg and Aspirin 81mg while in house given in holding area this morning.    Cr. 0.99, euvolemic on exam with lungs clear and no pedal edema noted, IV hydration 9% NS @ 50cc/hr ordered pre-Cath.    ASA Class III    Mallampati Class III    Risks & benefits of procedure and alternative therapy have been explained to the patient including but not limited to: allergic reaction, bleeding with possible need for blood transfusion, infection, renal and vascular compromise, limb damage, arrhythmia, stroke, vessel dissection/perforation, Myocardial Infarction, emergent CABG. Informed consent obtained and in chart.    Patient a candidate for sedation: Yes    Sedation Type: Moderate

## 2019-05-06 DIAGNOSIS — R00.0 TACHYCARDIA, UNSPECIFIED: ICD-10-CM

## 2019-05-06 DIAGNOSIS — F55.3 ABUSE OF STEROIDS OR HORMONES: ICD-10-CM

## 2019-05-06 DIAGNOSIS — I21.4 NON-ST ELEVATION (NSTEMI) MYOCARDIAL INFARCTION: ICD-10-CM

## 2019-05-06 DIAGNOSIS — I50.20 UNSPECIFIED SYSTOLIC (CONGESTIVE) HEART FAILURE: ICD-10-CM

## 2019-05-06 DIAGNOSIS — Z86.14 PERSONAL HISTORY OF METHICILLIN RESISTANT STAPHYLOCOCCUS AUREUS INFECTION: ICD-10-CM

## 2019-05-06 DIAGNOSIS — F19.10 OTHER PSYCHOACTIVE SUBSTANCE ABUSE, UNCOMPLICATED: ICD-10-CM

## 2019-05-06 DIAGNOSIS — R03.0 ELEVATED BLOOD-PRESSURE READING, WITHOUT DIAGNOSIS OF HYPERTENSION: ICD-10-CM

## 2019-05-06 DIAGNOSIS — I42.9 CARDIOMYOPATHY, UNSPECIFIED: ICD-10-CM

## 2019-05-15 PROCEDURE — 85730 THROMBOPLASTIN TIME PARTIAL: CPT

## 2019-05-15 PROCEDURE — 80307 DRUG TEST PRSMV CHEM ANLYZR: CPT

## 2019-05-15 PROCEDURE — C1894: CPT

## 2019-05-15 PROCEDURE — 80053 COMPREHEN METABOLIC PANEL: CPT

## 2019-05-15 PROCEDURE — 99285 EMERGENCY DEPT VISIT HI MDM: CPT | Mod: 25

## 2019-05-15 PROCEDURE — 82550 ASSAY OF CK (CPK): CPT

## 2019-05-15 PROCEDURE — C8929: CPT

## 2019-05-15 PROCEDURE — 84484 ASSAY OF TROPONIN QUANT: CPT

## 2019-05-15 PROCEDURE — C1887: CPT

## 2019-05-15 PROCEDURE — 85610 PROTHROMBIN TIME: CPT

## 2019-05-15 PROCEDURE — 85025 COMPLETE CBC W/AUTO DIFF WBC: CPT

## 2019-05-15 PROCEDURE — 84443 ASSAY THYROID STIM HORMONE: CPT

## 2019-05-15 PROCEDURE — 83036 HEMOGLOBIN GLYCOSYLATED A1C: CPT

## 2019-05-15 PROCEDURE — 85379 FIBRIN DEGRADATION QUANT: CPT

## 2019-05-15 PROCEDURE — 80048 BASIC METABOLIC PNL TOTAL CA: CPT

## 2019-05-15 PROCEDURE — 83735 ASSAY OF MAGNESIUM: CPT

## 2019-05-15 PROCEDURE — 82553 CREATINE MB FRACTION: CPT

## 2019-05-15 PROCEDURE — 96374 THER/PROPH/DIAG INJ IV PUSH: CPT

## 2019-05-15 PROCEDURE — 36415 COLL VENOUS BLD VENIPUNCTURE: CPT

## 2019-05-15 PROCEDURE — C1874: CPT

## 2019-05-15 PROCEDURE — 85027 COMPLETE CBC AUTOMATED: CPT

## 2019-05-15 PROCEDURE — 75574 CT ANGIO HRT W/3D IMAGE: CPT

## 2019-05-15 PROCEDURE — 80061 LIPID PANEL: CPT

## 2019-05-15 PROCEDURE — C1753: CPT

## 2019-05-15 PROCEDURE — 83880 ASSAY OF NATRIURETIC PEPTIDE: CPT

## 2019-05-15 PROCEDURE — C1769: CPT

## 2019-05-15 PROCEDURE — 93005 ELECTROCARDIOGRAM TRACING: CPT | Mod: 76

## 2019-05-15 PROCEDURE — 71046 X-RAY EXAM CHEST 2 VIEWS: CPT

## 2019-06-01 ENCOUNTER — EMERGENCY (EMERGENCY)
Facility: HOSPITAL | Age: 34
LOS: 1 days | Discharge: HOME | End: 2019-06-01
Attending: EMERGENCY MEDICINE | Admitting: EMERGENCY MEDICINE
Payer: COMMERCIAL

## 2019-06-01 VITALS
DIASTOLIC BLOOD PRESSURE: 78 MMHG | HEART RATE: 76 BPM | OXYGEN SATURATION: 97 % | TEMPERATURE: 98 F | SYSTOLIC BLOOD PRESSURE: 118 MMHG | RESPIRATION RATE: 16 BRPM

## 2019-06-01 VITALS
OXYGEN SATURATION: 97 % | DIASTOLIC BLOOD PRESSURE: 79 MMHG | TEMPERATURE: 98 F | WEIGHT: 182.1 LBS | HEART RATE: 99 BPM | RESPIRATION RATE: 16 BRPM | SYSTOLIC BLOOD PRESSURE: 125 MMHG

## 2019-06-01 DIAGNOSIS — R05 COUGH: ICD-10-CM

## 2019-06-01 DIAGNOSIS — Z79.82 LONG TERM (CURRENT) USE OF ASPIRIN: ICD-10-CM

## 2019-06-01 DIAGNOSIS — I25.10 ATHEROSCLEROTIC HEART DISEASE OF NATIVE CORONARY ARTERY WITHOUT ANGINA PECTORIS: ICD-10-CM

## 2019-06-01 DIAGNOSIS — Z79.899 OTHER LONG TERM (CURRENT) DRUG THERAPY: ICD-10-CM

## 2019-06-01 LAB
ALBUMIN SERPL ELPH-MCNC: 3.3 G/DL — LOW (ref 3.4–5)
ALP SERPL-CCNC: 38 U/L — LOW (ref 40–120)
ALT FLD-CCNC: 90 U/L — HIGH (ref 12–42)
ANION GAP SERPL CALC-SCNC: 10 MMOL/L — SIGNIFICANT CHANGE UP (ref 9–16)
AST SERPL-CCNC: 48 U/L — HIGH (ref 15–37)
BASOPHILS NFR BLD AUTO: 0.6 % — SIGNIFICANT CHANGE UP (ref 0–2)
BILIRUB SERPL-MCNC: 0.6 MG/DL — SIGNIFICANT CHANGE UP (ref 0.2–1.2)
BUN SERPL-MCNC: 13 MG/DL — SIGNIFICANT CHANGE UP (ref 7–23)
CALCIUM SERPL-MCNC: 8.6 MG/DL — SIGNIFICANT CHANGE UP (ref 8.5–10.5)
CHLORIDE SERPL-SCNC: 106 MMOL/L — SIGNIFICANT CHANGE UP (ref 96–108)
CO2 SERPL-SCNC: 21 MMOL/L — LOW (ref 22–31)
CREAT SERPL-MCNC: 1.04 MG/DL — SIGNIFICANT CHANGE UP (ref 0.5–1.3)
EOSINOPHIL NFR BLD AUTO: 1.1 % — SIGNIFICANT CHANGE UP (ref 0–6)
GLUCOSE SERPL-MCNC: 104 MG/DL — HIGH (ref 70–99)
HCT VFR BLD CALC: 52.1 % — HIGH (ref 39–50)
HGB BLD-MCNC: 17.8 G/DL — HIGH (ref 13–17)
IMM GRANULOCYTES NFR BLD AUTO: 0.4 % — SIGNIFICANT CHANGE UP (ref 0–1.5)
LYMPHOCYTES # BLD AUTO: 22.9 % — SIGNIFICANT CHANGE UP (ref 13–44)
MCHC RBC-ENTMCNC: 30.3 PG — SIGNIFICANT CHANGE UP (ref 27–34)
MCHC RBC-ENTMCNC: 34.2 G/DL — SIGNIFICANT CHANGE UP (ref 32–36)
MCV RBC AUTO: 88.6 FL — SIGNIFICANT CHANGE UP (ref 80–100)
MONOCYTES NFR BLD AUTO: 9.4 % — SIGNIFICANT CHANGE UP (ref 2–14)
NEUTROPHILS NFR BLD AUTO: 65.6 % — SIGNIFICANT CHANGE UP (ref 43–77)
NT-PROBNP SERPL-SCNC: 101 PG/ML — SIGNIFICANT CHANGE UP
PLATELET # BLD AUTO: 285 K/UL — SIGNIFICANT CHANGE UP (ref 150–400)
POTASSIUM SERPL-MCNC: 4.2 MMOL/L — SIGNIFICANT CHANGE UP (ref 3.5–5.3)
POTASSIUM SERPL-SCNC: 4.2 MMOL/L — SIGNIFICANT CHANGE UP (ref 3.5–5.3)
PROT SERPL-MCNC: 6.3 G/DL — LOW (ref 6.4–8.2)
RBC # BLD: 5.88 M/UL — HIGH (ref 4.2–5.8)
RBC # FLD: 12.7 % — SIGNIFICANT CHANGE UP (ref 10.3–14.5)
SODIUM SERPL-SCNC: 137 MMOL/L — SIGNIFICANT CHANGE UP (ref 132–145)
WBC # BLD: 7.3 K/UL — SIGNIFICANT CHANGE UP (ref 3.8–10.5)
WBC # FLD AUTO: 7.3 K/UL — SIGNIFICANT CHANGE UP (ref 3.8–10.5)

## 2019-06-01 PROCEDURE — 99285 EMERGENCY DEPT VISIT HI MDM: CPT | Mod: 25

## 2019-06-01 PROCEDURE — 71250 CT THORAX DX C-: CPT | Mod: 26

## 2019-06-01 PROCEDURE — 71046 X-RAY EXAM CHEST 2 VIEWS: CPT | Mod: 26

## 2019-06-01 PROCEDURE — 93010 ELECTROCARDIOGRAM REPORT: CPT | Mod: NC

## 2019-06-01 NOTE — ED ADULT NURSE NOTE - CHIEF COMPLAINT QUOTE
productive cough x 4 days-dx with strep 1 week ago on last day of abx, (pmh of mi with stent 1 month ago)

## 2019-06-01 NOTE — ED PROVIDER NOTE - CARE PROVIDER_API CALL
Atilio Richards)  Cardiovascular Disease  [address], [address]rd [place] [address]  Phone: [phone]  Fax: [phone]  Follow Up Time: 1-3 Days

## 2019-06-01 NOTE — ED ADULT NURSE NOTE - NS ED NURSE LEVEL OF CONSCIOUSNESS ORIENTATION
[facility]   Statement to Patients  April 1, 2014      In an effort to ensure the large volume of patient prescription refills is processed in the most efficient and expeditious manner, we are asking our patients to assist us by calling your Pharmacy for all prescription refills, this will include also your  Mail Order Pharmacy. The pharmacy will contact our office electronically to continue the refill process. Please do not wait until the last minute to call your pharmacy. We need at least 48 hours (2days) to fill prescriptions. We also encourage you to call your pharmacy before going to  your prescription to make sure it is ready. With regard to controlled substance prescription refill requests (narcotic refills) that need to be picked up at our office, we ask your cooperation by providing us with at least 72 hours (3days) notice that you will need a refill. We will not refill narcotic prescription refill requests after 4:00pm on any weekday, Monday through Thursday, or after 2:00pm on Fridays, or on the weekends. We encourage everyone to explore another way of getting your prescription refill request processed using [employer], our patient web portal through our electronic medical record system. [employer] is an efficient and effective way to communicate your medication request directly to the office and  downloadable as an yaneth on your smart phone . [employer] also features a review functionality that allows you to view your medication list as well as leave messages for your physician. Are you ready to get connected? If so please review the attatched instructions or speak to any of our staff to get you set up right away! Thank you so much for your cooperation. Should you have any questions please contact our Practice Administrator.     The Physicians and Staff,  [facility]          A Healthy Lifestyle: Care Instructions  Your Care Instructions  A healthy lifestyle can help you feel good, stay at a healthy weight, and have plenty of energy for both work and play. A healthy lifestyle is something you can share with your whole family. A healthy lifestyle also can lower your risk for serious health problems, such as high blood pressure, heart disease, and diabetes. You can follow a few steps listed below to improve your health and the health of your family. Follow-up care is a key part of your treatment and safety. Be sure to make and go to all appointments, and call your doctor if you are having problems. Its also a good idea to know your test results and keep a list of the medicines you take. How can you care for yourself at home? · Do not eat too much sugar, fat, or fast foods. You can still have dessert and treats now and then. The goal is moderation. · Start small to improve your eating habits. Pay attention to portion sizes, drink less juice and soda pop, and eat more fruits and vegetables. ¨ Eat a healthy amount of food. A 3-ounce serving of meat, for example, is about the size of a deck of cards. Fill the rest of your plate with vegetables and whole grains. ¨ Limit the amount of soda and sports drinks you have every day. Drink more water when you are thirsty. ¨ Eat at least 5 servings of fruits and vegetables every day. It may seem like a lot, but it is not hard to reach this goal. A serving or helping is 1 piece of fruit, 1 cup of vegetables, or 2 cups of leafy, raw vegetables. Have an apple or some carrot sticks as an afternoon snack instead of a candy bar. Try to have fruits and/or vegetables at every meal.  · Make exercise part of your daily routine. You may want to start with simple activities, such as walking, bicycling, or slow swimming. Try to be active 30 to 60 minutes every day. You do not need to do all 30 to 60 minutes all at once. For example, you can exercise 3 times a day for 10 or 20 minutes.  Moderate exercise is safe for most people, but it is always a good idea to talk to your doctor before starting an exercise program.  · Keep moving. [place] the lawn, work in the garden, or [employer]. Take the stairs instead of the elevator at work. · If you smoke, quit. People who smoke have an increased risk for heart attack, stroke, cancer, and other lung illnesses. Quitting is hard, but there are ways to boost your chance of quitting tobacco for good. ¨ Use nicotine gum, patches, or lozenges. ¨ Ask your doctor about stop-smoking programs and medicines. ¨ Keep trying. In addition to reducing your risk of diseases in the future, you will notice some benefits soon after you stop using tobacco. If you have shortness of breath or asthma symptoms, they will likely get better within a few weeks after you quit. · Limit how much alcohol you drink. Moderate amounts of alcohol (up to 2 drinks a day for men, 1 drink a day for women) are okay. But drinking too much can lead to liver problems, high blood pressure, and other health problems. Family health  If you have a family, there are many things you can do together to improve your health. · Eat meals together as a family as often as possible. · Eat healthy foods. This includes fruits, vegetables, lean meats and dairy, and whole grains. · Include your family in your fitness plan. Most people think of activities such as jogging or tennis as the way to fitness, but there are many ways you and your family can be more active. Anything that makes you breathe hard and gets your heart pumping is exercise. Here are some tips:  ¨ Walk to do errands or to take your child to school or the bus. ¨ Go for a family bike ride after dinner instead of watching TV. Where can you learn more? Go to http://rowena-jalen.info/. Enter G246 in the search box to learn more about \"A Healthy Lifestyle: Care Instructions. \"  Current as of: July 26, 2016  Content Version: 11.3  © 2107-0828 [employer]. Care instructions adapted under license by [employer] (which disclaims liability or warranty for this information). If you have questions about a medical condition or this instruction, always ask your healthcare professional. [address] any warranty or liability for your use of this information. Oriented - self; Oriented - place; Oriented - time

## 2019-06-01 NOTE — ED PROVIDER NOTE - CLINICAL SUMMARY MEDICAL DECISION MAKING FREE TEXT BOX
pt remained stable throughout ed stay. minimal cough.  discussion with patient that lisinopril may be causing cough and advised him to follow up with his cardiologist  pt requests cardiology referral

## 2019-06-01 NOTE — ED ADULT NURSE REASSESSMENT NOTE - NS ED NURSE REASSESS COMMENT FT1
Pt received from LUIS Kapoor. Pt is A&Ox3 at this time c.o 4/10 throat pain and hacking dry cough x 4 days. Pt states he was diagnosed with strep throat a week ago and has been taking abx with last dose today and had an US of his neck with showed a 1.9 cm nodule on his thyroid which is is pending a biopsy for. Pt denies fever chills nausea vomiting diarrhea, chest pain or sob at this time. Pt stable with friend at bedside. Will continue to monitor.

## 2019-06-01 NOTE — ED PROVIDER NOTE - DIAGNOSTIC INTERPRETATION
Interpreted by MD  Chest x-ray, 2 views  Lungs clear, heart shadow normal, bony structures normal, no free air under diaphragm, no PTX

## 2019-06-01 NOTE — ED PROVIDER NOTE - NSFOLLOWUPINSTRUCTIONS_ED_ALL_ED_FT
follow up with cardiology    take all medications as directed     return to ER if symptoms worsen or for any other concern

## 2019-06-01 NOTE — ED PROVIDER NOTE - OBJECTIVE STATEMENT
Triage note: productive cough x 4 days-dx with strep 1 week ago on last day of abx, ([facility] of mi with stent 1 month ago)  Vitals: HR 99, /79, Resp 16, Temp(F) 98, SpO2 97  Present during history-taking: Lloyd Horan (pt), Chun Palacios (pt's partner), Dr. Malone (attending), Renetta Patel (scribe)    33y M with PMHx MI Triage note: productive cough x 4 days-dx with strep 1 week ago on last day of abx, ([facility] of mi with stent 1 month ago)  Vitals: HR 99, /79, Resp 16, Temp(F) 98, SpO2 97  Present during history-taking: Lloyd Horan (pt), Chun Palacios (pt's partner), Dr. Malone (attending), Renetta Patel (scribe)    33y M with PMHx CAD, MI (last month, stent placed at [facility], on metoprolol), accompanied by partner, presents to ED c/o productive cough x 1-2 weeks. Pt states cough would consistently begin soon after getting up from bed in the morning, with clear, frothy sputum. Pt states he was getting up from resting in bed this afternoon when he began to cough with greater severity than usual. When he began to have difficulty catching his breath, he decided to come to come to ED. Denies fever/chills, chest pain, diaphoresis, leg swelling, rash, n/v/d, headache.    Pt notes he is on his last day of amoxicillin for pharyngitis. He has 1.5 days left of flagyl and is to be on a 10-day course of paromomycin for a parasite diagnosed by his PCP. Pt's partner notes they traveled to [place] 5 months ago and make frequent trips to [place]. Pt was seen in this ED 1 month ago for chest pain and was admitted to [facility] for MI. He had a stent placed at [facility] due to 80% occlusion of LAD. Since discharge from [facility], he reports feeling generally well with occasional fleeting chest discomfort. Triage note: productive cough x 4 days-dx with strep 1 week ago on last day of abx, ([facility] of mi with stent 1 month ago)  Vitals: HR 99, /79, Resp 16, Temp(F) 98, SpO2 97  Present during history-taking: Lloyd Horan (pt), Chun Palacios (pt's partner), Dr. Malone (attending), Renetta Patel (scribe)    33y M with PMHx CAD, MI (last month, stent placed at [facility], on metoprolol), accompanied by partner, presents to ED c/o productive cough x 1-2 weeks. Pt states cough would consistently begin soon after getting up from bed in the morning, with clear, frothy sputum. Pt states he was getting up from resting in bed this afternoon when he began to cough with greater severity than usual. When he began to have difficulty catching his breath, he decided to come to come to ED. Denies fever/chills, chest pain, diaphoresis, leg swelling, rash, n/v/d, headache.    Pt notes he is on his last day of amoxicillin for pharyngitis. He has 1.5 days left of flagyl and is to be on a 10-day course of paromomycin for a parasite diagnosed by his PCP. Pt's partner notes they traveled to [place] 5 months ago and make frequent trips to [place]. Pt was seen in this ED 1 month ago for chest pain and was admitted to [facility] for MI. He had a stent placed at [facility] due to 80% occlusion of LAD. Since discharge from [facility], he reports feeling generally well with occasional fleeting chest discomfort.    Meds: paromomycin 250 mg tid, atorvastatin 80 mg qhs, amoxicillin 500 mg qd, flagyl 250 mg bid, clopidogrel 75 mg qd, aspirin 81 mg qd, Truvada 200 mg-300 mg qd, lisinopril 2.5 mg qd, metoprolol ER 75 mg (increased from 25 mg), probiotics

## 2019-06-01 NOTE — ED PROVIDER NOTE - PMH
Drug abuse    MI (myocardial infarction) CAD (coronary artery disease)    Drug abuse    MI (myocardial infarction)

## 2019-06-01 NOTE — ED ADULT TRIAGE NOTE - CHIEF COMPLAINT QUOTE
productive cough x 4 days ([facility] of mi with stent 1 month ago) productive cough x 4 days-dx with strep 1 week ago on last day of abx, ([facility] of mi with stent 1 month ago)

## 2019-06-02 PROBLEM — F19.10 OTHER PSYCHOACTIVE SUBSTANCE ABUSE, UNCOMPLICATED: Chronic | Status: ACTIVE | Noted: 2019-04-29

## 2019-12-08 ENCOUNTER — EMERGENCY (EMERGENCY)
Facility: HOSPITAL | Age: 34
LOS: 1 days | Discharge: HOME | End: 2019-12-08
Attending: EMERGENCY MEDICINE | Admitting: EMERGENCY MEDICINE
Payer: COMMERCIAL

## 2019-12-08 VITALS
RESPIRATION RATE: 15 BRPM | OXYGEN SATURATION: 94 % | SYSTOLIC BLOOD PRESSURE: 128 MMHG | WEIGHT: 177.03 LBS | HEIGHT: 70 IN | TEMPERATURE: 98 F | HEART RATE: 81 BPM | DIASTOLIC BLOOD PRESSURE: 77 MMHG

## 2019-12-08 DIAGNOSIS — N48.30 PRIAPISM, UNSPECIFIED: ICD-10-CM

## 2019-12-08 DIAGNOSIS — Z90.09 ACQUIRED ABSENCE OF OTHER PART OF HEAD AND NECK: Chronic | ICD-10-CM

## 2019-12-08 PROBLEM — I21.9 ACUTE MYOCARDIAL INFARCTION, UNSPECIFIED: Chronic | Status: ACTIVE | Noted: 2019-06-01

## 2019-12-08 PROBLEM — I25.10 ATHEROSCLEROTIC HEART DISEASE OF NATIVE CORONARY ARTERY WITHOUT ANGINA PECTORIS: Chronic | Status: ACTIVE | Noted: 2019-06-01

## 2019-12-08 PROCEDURE — 54220 IRRG CRPRA CAVRNOSA PRIAPISM: CPT

## 2019-12-08 PROCEDURE — 99291 CRITICAL CARE FIRST HOUR: CPT | Mod: 25

## 2019-12-08 RX ORDER — PHENYLEPHRINE HYDROCHLORIDE 10 MG/ML
3 INJECTION INTRAVENOUS ONCE
Refills: 0 | Status: COMPLETED | OUTPATIENT
Start: 2019-12-08 | End: 2019-12-08

## 2019-12-08 RX ADMIN — PHENYLEPHRINE HYDROCHLORIDE 3 MILLIGRAM(S): 10 INJECTION INTRAVENOUS at 06:46

## 2019-12-08 NOTE — ED PROVIDER NOTE - CRITICAL CARE PROVIDED
documentation/direct patient care (not related to procedure)/consult w/ pt's family directly relating to pts condition/additional history taking

## 2019-12-08 NOTE — ED PROVIDER NOTE - ATTENDING CONTRIBUTION TO CARE
patient seen and examined at bedside with pa elham. procedure of drainage, and injection of phenylephrine done and assisted at bedside with pa elham. patient arrives hd stable otherwise, with improvement and resolved priprism in ED, stable for discharge.

## 2019-12-08 NOTE — ED PROVIDER NOTE - PHYSICAL EXAMINATION
Vital Signs - nursing notes reviewed and confirmed  Gen - WDWN M, uncomfortable appearing 2/2 pain, non-toxic, speaking in full sentences   Skin - warm, dry, intact  HEENT - AT/NC, PERRL, EOMI, no conjunctival injection, moist oral mucosa, TM intact b/l with good cone of lights, o/p clear with no erythema, edema, or exudate, uvula midline, airway patent, neck supple and NT, FROM  CV - S1S2, R/R/R  Resp - respiration non-labored, CTAB, symmetric bs b/l, no r/r/w  GI - NABS, soft, ND, NT, no rebound or guarding, no CVAT b/l    - +priapism with tender erected penis, no fluctuance, crepitus, rash, d/c, or scrotal erythema/edema/ecchymosis/fluctuance/warmth, cremasteric reflex intact   MS - w/w/p, no c/c/e, calves supple and NT, distal pulses symmetric b/l, brisk cap refills, +SILT  Neuro - AxOx3, no focal neuro deficits, ambulatory without gait disturbance

## 2019-12-08 NOTE — ED PROCEDURE NOTE - GENERAL PROCEDURE DETAILS
L intracavernosa space prepped with betadine and 20ga butterfly needle inserted at 3 o'clock direction with 30cc of venous blood aspirated under aseptic technique, 4cc of diluted 100mcg of phenylephrine injected into the same space subsequently, detumescent achieved and pt monitored for 45 mins s/p procedure without rebound sx noted, no active bleeding noted, sensation intact, pt reports back to baseline

## 2019-12-08 NOTE — ED PROVIDER NOTE - CLINICAL SUMMARY MEDICAL DECISION MAKING FREE TEXT BOX
pt p/w prolonged priapism x 5 hrs s/p recreational injection last night, attempted phenylephrine injection at home PTA to the ED without relief, will decompress with intracavernous needle aspiration follow by subsequent phenylephrine injection, monitor for detumescence and rebound sx

## 2019-12-08 NOTE — ED ADULT NURSE NOTE - OBJECTIVE STATEMENT
Pt is a 34y male complaining of priapism for the past 5 hours after injecting papaverine/phentolamine/prostaglandin. Pt states that he is urinating normally.

## 2019-12-08 NOTE — ED PROVIDER NOTE - NSFOLLOWUPINSTRUCTIONS_ED_ALL_ED_FT
Priapism    Priapism is an unwanted erection of the penis that usually develops without sexual stimulation or desire. Priapism affects males of all ages.     There are three types of priapism:  Recurrent acute priapism. With this type, erections are painful and last less than 3 hours. The erections come and go.Acute prolonged priapism. With this type, erections are painful and last hours to days. This type can lead to erectile dysfunction.Persistent priapism. With this type, erections are usually painless and can last weeks to years. The penis gets erect but not rigid. This type can lead to erectile dysfunction.    What are the causes?    This condition develops either when blood has difficulty leaving the penis (low-flow priapism) or if too much blood flows into the penis (high-flow priapism). Blood flow issues may be caused by:  Erectile dysfunction medicine. This is the most common cause.Medicine that is used to treat depression and anxiety.Blood problems that are common in people who have sickle cell disease or leukemia.Use of illegal drugs, such as cocaine and marijuana.Excessive alcohol use.Neurological problems, such as multiple sclerosis.Diabetes mellitus.An injury to the penis.An infection.In some cases, the cause may not be known.    What are the signs or symptoms?    Symptoms of this condition include:  A prolonged erection.A painful erection.How is this diagnosed?  This condition is diagnosed with a physical exam. Blood tests may be done to help identify the cause of the condition.  How is this treated?  Treatment for this condition depends on the cause and the type of priapism. Recurrent acute priapism is often managed at home. Acute prolonged priapism is usually treated at a hospital, where treatment may involve:  Getting fluid and medicines for pain through an IV tube.A blood transfusion.A procedure to drain blood from the penis.Surgery to make a passageway for blood to flow in the penis (surgical shunting).No standard treatment exists for persistent priapism.  Follow these instructions at home:    Managing Recurrent Priapism     Try taking a warm bath or exercising.Keep track of how long your erection lasts. If it does not go away in 3 hours, seek medical care.General instructions     Avoid sexual stimulation and intercourse until your health care provider says that they are okay for you.Avoid drugs or alcohol if they caused the priapism. Avoiding them can help to prevent the condition from coming back.Drink enough fluid to keep your urine clear or pale yellow.Empty your bladder as much as possible.Take over-the-counter and prescription medicines only as told by your health care provider.Do not take any medicines during an episode unless you get approval from your health care provider.Keep all follow-up visits as told by your health care provider. This is important.    Contact a health care provider if:  Your pain gets worse.Your pain does not improve with treatment.You have recurrent priapism and your erection does not go away in 3 hours.Get help right away if:  You have a fever or chills.You have pain, swelling, or redness in your genitals or your groin area.

## 2019-12-08 NOTE — ED PROVIDER NOTE - OBJECTIVE STATEMENT
33 yo M with PMHx of thyroid CA s/p Total thyroidectomy and RTX, in remission, CAD, and drug use, presenting c/o priapism x 5 hrs.  Pt reports injecting approximately 5mL of papaverine/phentolamine/prostaglandin  recreationally last night and noted prolonged erection x 5 hours.  Attempted alleviation of sx with prescribed phenylephrine injection PTA with no relief.  Now with pain and discomfort. Denies trauma, fall, fever, chills, hematuria, difficulty voiding, abdominal pain, change in bowel function, flank pain, rash, HA, dizziness, SOB, CP, palpitations, diaphoresis, cough, and malaise. No h/o sickle cell noted

## 2019-12-08 NOTE — ED PROVIDER NOTE - PATIENT PORTAL LINK FT
You can access the FollowMyHealth Patient Portal offered by [facility] by registering at the following website: [email or web address]. By joining [facility]’s FollowMyHealth portal, you will also be able to view your health information using other applications (apps) compatible with our system.

## 2019-12-08 NOTE — ED PROVIDER NOTE - PROGRESS NOTE DETAILS
Pt was seen immediately upon arrival due to a high probability of imminent or life-threatening deterioration secondary to prolonged priapism x 5 hrs, which required my direct attention, intervention, and personal management. I have personally provided 37 minutes of critical care time exclusive of time spent on separately billable procedures. Time includes review of laboratory data, radiology results, discussion with consultants, and monitoring for potential decompensation. Interventions were performed as documented above pt attempted phenylephrine injection at home PTA to the ED without relief, will decompress with intracavernous needle aspiration follow by subsequent phenylephrine injection 30cc venous blood aspirated at bedside without complication, pt tolerated procedure well, detumescent achieved and sterile dressing applied, will continue to monitor for rebound sx pt monitored for 45 min s/p procedure with no rebound sx noted, counseling provided, pt feels back to baseline, f/u with urology

## 2020-12-14 ENCOUNTER — EMERGENCY (EMERGENCY)
Facility: HOSPITAL | Age: 35
LOS: 1 days | Discharge: HOME | End: 2020-12-14
Admitting: EMERGENCY MEDICINE
Payer: COMMERCIAL

## 2020-12-14 VITALS
HEART RATE: 109 BPM | WEIGHT: 186.95 LBS | SYSTOLIC BLOOD PRESSURE: 155 MMHG | TEMPERATURE: 98 F | OXYGEN SATURATION: 97 % | RESPIRATION RATE: 16 BRPM | HEIGHT: 70 IN | DIASTOLIC BLOOD PRESSURE: 83 MMHG

## 2020-12-14 DIAGNOSIS — R79.82 ELEVATED C-REACTIVE PROTEIN (CRP): ICD-10-CM

## 2020-12-14 DIAGNOSIS — R51.9 HEADACHE, UNSPECIFIED: ICD-10-CM

## 2020-12-14 DIAGNOSIS — Z79.899 OTHER LONG TERM (CURRENT) DRUG THERAPY: ICD-10-CM

## 2020-12-14 DIAGNOSIS — Z20.828 CONTACT WITH AND (SUSPECTED) EXPOSURE TO OTHER VIRAL COMMUNICABLE DISEASES: ICD-10-CM

## 2020-12-14 DIAGNOSIS — Z90.09 ACQUIRED ABSENCE OF OTHER PART OF HEAD AND NECK: Chronic | ICD-10-CM

## 2020-12-14 DIAGNOSIS — Z79.82 LONG TERM (CURRENT) USE OF ASPIRIN: ICD-10-CM

## 2020-12-14 DIAGNOSIS — F17.200 NICOTINE DEPENDENCE, UNSPECIFIED, UNCOMPLICATED: ICD-10-CM

## 2020-12-14 DIAGNOSIS — I25.10 ATHEROSCLEROTIC HEART DISEASE OF NATIVE CORONARY ARTERY WITHOUT ANGINA PECTORIS: ICD-10-CM

## 2020-12-14 LAB
BASOPHILS # BLD AUTO: 0.04 K/UL — SIGNIFICANT CHANGE UP (ref 0–0.2)
BASOPHILS NFR BLD AUTO: 0.4 % — SIGNIFICANT CHANGE UP (ref 0–2)
EOSINOPHIL # BLD AUTO: 0.13 K/UL — SIGNIFICANT CHANGE UP (ref 0–0.5)
EOSINOPHIL NFR BLD AUTO: 1.3 % — SIGNIFICANT CHANGE UP (ref 0–6)
HCT VFR BLD CALC: 49.5 % — SIGNIFICANT CHANGE UP (ref 39–50)
HGB BLD-MCNC: 16.2 G/DL — SIGNIFICANT CHANGE UP (ref 13–17)
IMM GRANULOCYTES NFR BLD AUTO: 0.9 % — SIGNIFICANT CHANGE UP (ref 0–1.5)
LYMPHOCYTES # BLD AUTO: 3.08 K/UL — SIGNIFICANT CHANGE UP (ref 1–3.3)
LYMPHOCYTES # BLD AUTO: 30.8 % — SIGNIFICANT CHANGE UP (ref 13–44)
MCHC RBC-ENTMCNC: 29.9 PG — SIGNIFICANT CHANGE UP (ref 27–34)
MCHC RBC-ENTMCNC: 32.7 GM/DL — SIGNIFICANT CHANGE UP (ref 32–36)
MCV RBC AUTO: 91.3 FL — SIGNIFICANT CHANGE UP (ref 80–100)
MONOCYTES # BLD AUTO: 0.76 K/UL — SIGNIFICANT CHANGE UP (ref 0–0.9)
MONOCYTES NFR BLD AUTO: 7.6 % — SIGNIFICANT CHANGE UP (ref 2–14)
NEUTROPHILS # BLD AUTO: 5.9 K/UL — SIGNIFICANT CHANGE UP (ref 1.8–7.4)
NEUTROPHILS NFR BLD AUTO: 59 % — SIGNIFICANT CHANGE UP (ref 43–77)
NRBC # BLD: 0 /100 WBCS — SIGNIFICANT CHANGE UP (ref 0–0)
PLATELET # BLD AUTO: 366 K/UL — SIGNIFICANT CHANGE UP (ref 150–400)
RBC # BLD: 5.42 M/UL — SIGNIFICANT CHANGE UP (ref 4.2–5.8)
RBC # FLD: 14.6 % — HIGH (ref 10.3–14.5)
WBC # BLD: 10 K/UL — SIGNIFICANT CHANGE UP (ref 3.8–10.5)
WBC # FLD AUTO: 10 K/UL — SIGNIFICANT CHANGE UP (ref 3.8–10.5)

## 2020-12-14 PROCEDURE — 99285 EMERGENCY DEPT VISIT HI MDM: CPT

## 2020-12-14 RX ORDER — SODIUM CHLORIDE 9 MG/ML
1000 INJECTION INTRAMUSCULAR; INTRAVENOUS; SUBCUTANEOUS ONCE
Refills: 0 | Status: COMPLETED | OUTPATIENT
Start: 2020-12-14 | End: 2020-12-14

## 2020-12-14 RX ADMIN — SODIUM CHLORIDE 1000 MILLILITER(S): 9 INJECTION INTRAMUSCULAR; INTRAVENOUS; SUBCUTANEOUS at 23:57

## 2020-12-14 NOTE — ED ADULT NURSE NOTE - OBJECTIVE STATEMENT
34 y/o male who reports acute left sided frontal headache at 7pm which lasted for 1 hr- pt with h/o thyroid cancer, recent stent placement to LAD is currently on plavix and ASA. Pt states hse feels " foggy" but is A+Ox4 denies chest, sob, change in vision, dizziness, nausea, tingling or numbness.

## 2020-12-14 NOTE — ED PROVIDER NOTE - PATIENT PORTAL LINK FT
You can access the FollowMyHealth Patient Portal offered by [facility] by registering at the following website: [email or web address]. By joining [facility]’s FollowMyHealth portal, you will also be able to view your health information using other applications (apps) compatible with our system.

## 2020-12-14 NOTE — ED ADULT TRIAGE NOTE - CHIEF COMPLAINT QUOTE
at computer tonight sudden onset of frontal headache, now feels "fuzzy" in the head, gcs 15, p4earl, is on dual platelet therapy for clogged LAD repair last year, nil nausea, on carvedilol , plavix, aspirin, losartan, compliant with meds at computer tonight sudden onset of frontal headache, now feels "fuzzy" in the head, gcs 15, p4earl, is on dual platelet therapy for clogged LAD repair last year, nil nausea, on carvedilol , plavix, aspirin, losartan, compliant with meds, tested negative for covid post going to [place] for thanksgiving

## 2020-12-14 NOTE — ED PROVIDER NOTE - OBJECTIVE STATEMENT
34 yo M with PMHx of MI s/p PCI with stent to LAD one yr ago, on baby ASA and plavix, thyroid CA s/p total thyroidectomy with iodine replacement therapy, presenting c/o sudden onset of L frontal HA since 7pm.  Pt reports sudden onset of "strong throbbing HA in the L frontal region" around 7pm while working on his laptop.  Pain is constant and has been improving in the past few hours.  Endorses associated photophobia, retroorbital pressure, and "feeling foggy." Currently rates pain 1/10.  Denies trauma, fall, dizziness, numbness, tingling, diplopia, change in vision/hearing/gait/mental status/speech, focal weakness, neck pain, rash, fever, chills, stiffness, CP, SOB, palpitations, diaphoresis, N/V/D/C, abdominal pain, change in urinary/bowel function, dysuria, hematuria, flank pain, and malaise. Of note, pt tested negative for COVID 1 wk ago, had some URI sx 2 wks ago after returning from [facility] and tested negative for COVID one wk.

## 2020-12-14 NOTE — ED PROVIDER NOTE - PHYSICAL EXAMINATION
Vital Signs - nursing notes reviewed and confirmed  Gen - WDWN M, NAD, comfortable and non-toxic appearing, speaking in full sentences   Skin - warm, dry, intact  HEENT - AT/NC, PERRL, EOMI, no conjunctival injection, moist oral mucosa, TM intact b/l with good cone of lights, o/p clear with no erythema, edema, or exudate, uvula midline, airway patent, neck supple and NT, FROM, no JVD or carotid bruits b/l, no palpable nodes  CV - S1S2, R/R/R  Resp - respiration non-labored, CTAB, symmetric bs b/l, no r/r/w  GI - NABS, soft, ND, NT, no rebound or guarding, no CVAT b/l   MS - w/w/p, no c/c/e, calves supple and NT, distal pulses symmetric b/l, brisk cap refills, +SILT  Neuro - AxOx3, no focal neuro deficits, CN II-XII grossly intact, cerebellar function intact, negative pronator drift, negative nystagmus, ambulatory without gait disturbance, strength intact and symmetric b/l 5/5 BUE/BLE

## 2020-12-14 NOTE — ED PROVIDER NOTE - CARE PROVIDER_API CALL
Alex Swift)  Neurology  [address] [address]  Phone: [phone]  Fax: [phone]  Follow Up Time:

## 2020-12-14 NOTE — ED PROVIDER NOTE - CARE PLAN
Principal Discharge DX:	Headache   Principal Discharge DX:	Headache  Secondary Diagnosis:	Elevated CPK

## 2020-12-14 NOTE — ED ADULT NURSE NOTE - CHIEF COMPLAINT QUOTE
at computer tonight sudden onset of frontal headache, now feels "fuzzy" in the head, gcs 15, p4earl, is on dual platelet therapy for clogged LAD repair last year, nil nausea, on carvedilol , plavix, aspirin, losartan, compliant with meds, tested negative for covid post going to [place] for thanksgiving

## 2020-12-15 VITALS
SYSTOLIC BLOOD PRESSURE: 166 MMHG | RESPIRATION RATE: 16 BRPM | TEMPERATURE: 98 F | HEART RATE: 80 BPM | DIASTOLIC BLOOD PRESSURE: 78 MMHG | OXYGEN SATURATION: 98 %

## 2020-12-15 PROBLEM — C73 MALIGNANT NEOPLASM OF THYROID GLAND: Chronic | Status: ACTIVE | Noted: 2019-12-08

## 2020-12-15 LAB
ALBUMIN SERPL ELPH-MCNC: 3.4 G/DL — SIGNIFICANT CHANGE UP (ref 3.4–5)
ALP SERPL-CCNC: 30 U/L — LOW (ref 40–120)
ALT FLD-CCNC: 37 U/L — SIGNIFICANT CHANGE UP (ref 12–42)
AMPHET UR-MCNC: NEGATIVE — SIGNIFICANT CHANGE UP
ANION GAP SERPL CALC-SCNC: 7 MMOL/L — LOW (ref 9–16)
APTT BLD: 25.8 SEC — LOW (ref 27.5–35.5)
AST SERPL-CCNC: 59 U/L — HIGH (ref 15–37)
BARBITURATES UR SCN-MCNC: NEGATIVE — SIGNIFICANT CHANGE UP
BENZODIAZ UR-MCNC: NEGATIVE — SIGNIFICANT CHANGE UP
BILIRUB SERPL-MCNC: 0.6 MG/DL — SIGNIFICANT CHANGE UP (ref 0.2–1.2)
BUN SERPL-MCNC: 19 MG/DL — SIGNIFICANT CHANGE UP (ref 7–23)
CALCIUM SERPL-MCNC: 9 MG/DL — SIGNIFICANT CHANGE UP (ref 8.5–10.5)
CHLORIDE SERPL-SCNC: 103 MMOL/L — SIGNIFICANT CHANGE UP (ref 96–108)
CK SERPL-CCNC: 790 U/L — HIGH (ref 39–308)
CO2 SERPL-SCNC: 26 MMOL/L — SIGNIFICANT CHANGE UP (ref 22–31)
COCAINE METAB.OTHER UR-MCNC: NEGATIVE — SIGNIFICANT CHANGE UP
CREAT SERPL-MCNC: 1.07 MG/DL — SIGNIFICANT CHANGE UP (ref 0.5–1.3)
GLUCOSE SERPL-MCNC: 93 MG/DL — SIGNIFICANT CHANGE UP (ref 70–99)
INR BLD: 1.03 — SIGNIFICANT CHANGE UP (ref 0.88–1.16)
MAGNESIUM SERPL-MCNC: 2 MG/DL — SIGNIFICANT CHANGE UP (ref 1.6–2.6)
METHADONE UR-MCNC: NEGATIVE — SIGNIFICANT CHANGE UP
OPIATES UR-MCNC: NEGATIVE — SIGNIFICANT CHANGE UP
PCP SPEC-MCNC: SIGNIFICANT CHANGE UP
PCP UR-MCNC: NEGATIVE — SIGNIFICANT CHANGE UP
POTASSIUM SERPL-MCNC: 5.4 MMOL/L — HIGH (ref 3.5–5.3)
POTASSIUM SERPL-SCNC: 5.4 MMOL/L — HIGH (ref 3.5–5.3)
PROT SERPL-MCNC: 7.4 G/DL — SIGNIFICANT CHANGE UP (ref 6.4–8.2)
PROTHROM AB SERPL-ACNC: 12.3 SEC — SIGNIFICANT CHANGE UP (ref 10.6–13.6)
SARS-COV-2 RNA SPEC QL NAA+PROBE: SIGNIFICANT CHANGE UP
SODIUM SERPL-SCNC: 136 MMOL/L — SIGNIFICANT CHANGE UP (ref 132–145)
THC UR QL: NEGATIVE — SIGNIFICANT CHANGE UP

## 2020-12-15 PROCEDURE — 70496 CT ANGIOGRAPHY HEAD: CPT | Mod: 26

## 2020-12-15 PROCEDURE — 70498 CT ANGIOGRAPHY NECK: CPT | Mod: 26

## 2020-12-15 PROCEDURE — 70450 CT HEAD/BRAIN W/O DYE: CPT | Mod: 26,59

## 2020-12-31 NOTE — ED ADULT NURSE NOTE - NSFALLRSKASSESASSIST_ED_ALL_ED
January 5, 2021    RE:  Marie Vogel  [address] [place] [address]    Letter of medical necessity/ Letter of Appeal.    To Whom It May Concern:    Marie Vogel is currently under our care for insulin-dependent diabetes.  Marie's regimen currently includes Trulicity which she is tolerating well and her blood sugars are well controlled.   Prior authorization for Trulicity was recently denied.  Request coverage for Trulicity.        Sincerely,        Alan Paredes MD     
no

## 2021-12-09 NOTE — ED PROVIDER NOTE - NS ED SCRIBE STATEMENT
[de-identified] : Referred by: Hospital Follow up\par \par Mr. Staples presented at age 60 in December 2021 for evaluation of history of pancreatic cancer and new enlarged paraesophageal LN. \par The patient has a medical history of pancreatic ca s/p whipple in 2015 and subsequent ventral hernia repair in 2020, etoh misuse disorder, HTN, anxiety (on lexapro). \par \par The patient was recently admitted to [facility] [place]21 to 11/12/21 with intractable nausea, vomiting and epigastric pain and was found to have a partial SBO due to pancolitis which was managed conservatively.  He has a history of pancreatic cancer*status post Whipple with no adjuvant chemotherapy or radiation therapy.  Upon evaluation at [facility] he was found to have a partial SBO and 2.4 cm soft tissue mass seen adjacent to the esophagus on imaging, possibly concerning for an enlarged lymph node.  MRI/MRCP on November 11, 2021 was notable for pancolitis, no biliary dilatation, remaining pancreas is atrophic s/p Whipple.  CT chest on 11/11/2021 was notable for mediastinal lymphadenopathy, not significantly changed from June 2019 except a single right azygoesophageal lymph node which is minimally enlarged from June 2019 from 14 mm to 20 mm. Plan at that time was for patient to follow-up for endoscopy and biopsy of the paraesophageal lymph node with Dr. Muñoz at [place].\par \par The patient presents today for postdischarge follow-up.  He reports having recent issues with his memory.  Of note he was involved in the cleanup after 9/11 and follows with the [facility]. He reports some intermittent abdominal pain since discharge. Otherwise he reports feeling very fatigued. His appetite is normal and his weight is stable. He denies any blood in his stools. He is due for colonoscopy, had one 2 years ago which showed polyps. He otherwise denies fevers, chills, chest pain, shortness of breath, nausea, vomiting, diarrhea, lower extremity edema. He is very active works as a  and walks 10 miles per day.\par \par Labs today reviewed and notable for: WBC 7.68, Hb 13.5, Plt 172\par \par HCM: \par - COVID vaccination: Moderna, due for booster \par - Flu vaccine done 9/2021\par \par SH: \par - Occupation:  walks 10 miles a day \par - Living situation: lives in [place] with his wife, 3 kids are grown \par - Smoking/etoh/illicits: never smoker, drinks 3 beers/vodka drinks every other day, denies illicits \par - Exercise: walks 10 miles a day \par \par FH: \par - No family history of cancer \par Wife has bladder cancer (?secondary 9/11 exposure) \par 
Attending

## 2022-08-02 ENCOUNTER — EMERGENCY (EMERGENCY)
Facility: HOSPITAL | Age: 37
LOS: 1 days | Discharge: HOME | End: 2022-08-02
Attending: EMERGENCY MEDICINE | Admitting: EMERGENCY MEDICINE

## 2022-08-02 VITALS
OXYGEN SATURATION: 97 % | HEART RATE: 104 BPM | HEIGHT: 69 IN | RESPIRATION RATE: 16 BRPM | DIASTOLIC BLOOD PRESSURE: 70 MMHG | SYSTOLIC BLOOD PRESSURE: 117 MMHG | WEIGHT: 192.02 LBS | TEMPERATURE: 98 F

## 2022-08-02 VITALS
HEART RATE: 88 BPM | DIASTOLIC BLOOD PRESSURE: 75 MMHG | RESPIRATION RATE: 18 BRPM | OXYGEN SATURATION: 98 % | SYSTOLIC BLOOD PRESSURE: 133 MMHG | TEMPERATURE: 99 F

## 2022-08-02 DIAGNOSIS — Z82.49 FAMILY HISTORY OF ISCHEMIC HEART DISEASE AND OTHER DISEASES OF THE CIRCULATORY SYSTEM: ICD-10-CM

## 2022-08-02 DIAGNOSIS — I25.10 ATHEROSCLEROTIC HEART DISEASE OF NATIVE CORONARY ARTERY WITHOUT ANGINA PECTORIS: ICD-10-CM

## 2022-08-02 DIAGNOSIS — M62.82 RHABDOMYOLYSIS: ICD-10-CM

## 2022-08-02 DIAGNOSIS — F19.10 OTHER PSYCHOACTIVE SUBSTANCE ABUSE, UNCOMPLICATED: ICD-10-CM

## 2022-08-02 DIAGNOSIS — I25.2 OLD MYOCARDIAL INFARCTION: ICD-10-CM

## 2022-08-02 DIAGNOSIS — R53.83 OTHER FATIGUE: ICD-10-CM

## 2022-08-02 DIAGNOSIS — Z95.5 PRESENCE OF CORONARY ANGIOPLASTY IMPLANT AND GRAFT: Chronic | ICD-10-CM

## 2022-08-02 DIAGNOSIS — Z95.5 PRESENCE OF CORONARY ANGIOPLASTY IMPLANT AND GRAFT: ICD-10-CM

## 2022-08-02 DIAGNOSIS — Z79.82 LONG TERM (CURRENT) USE OF ASPIRIN: ICD-10-CM

## 2022-08-02 DIAGNOSIS — Z90.09 ACQUIRED ABSENCE OF OTHER PART OF HEAD AND NECK: Chronic | ICD-10-CM

## 2022-08-02 DIAGNOSIS — Z85.850 PERSONAL HISTORY OF MALIGNANT NEOPLASM OF THYROID: ICD-10-CM

## 2022-08-02 LAB
ALBUMIN SERPL ELPH-MCNC: 3.3 G/DL — LOW (ref 3.4–5)
ALP SERPL-CCNC: 37 U/L — LOW (ref 40–120)
ALT FLD-CCNC: 61 U/L — HIGH (ref 12–42)
ANION GAP SERPL CALC-SCNC: 6 MMOL/L — LOW (ref 9–16)
APPEARANCE UR: CLEAR — SIGNIFICANT CHANGE UP
AST SERPL-CCNC: 56 U/L — HIGH (ref 15–37)
BASOPHILS # BLD AUTO: 0.06 K/UL — SIGNIFICANT CHANGE UP (ref 0–0.2)
BASOPHILS NFR BLD AUTO: 0.8 % — SIGNIFICANT CHANGE UP (ref 0–2)
BILIRUB SERPL-MCNC: 0.5 MG/DL — SIGNIFICANT CHANGE UP (ref 0.2–1.2)
BILIRUB UR-MCNC: NEGATIVE — SIGNIFICANT CHANGE UP
BUN SERPL-MCNC: 26 MG/DL — HIGH (ref 7–23)
CALCIUM SERPL-MCNC: 8.8 MG/DL — SIGNIFICANT CHANGE UP (ref 8.5–10.5)
CHLORIDE SERPL-SCNC: 102 MMOL/L — SIGNIFICANT CHANGE UP (ref 96–108)
CK MB BLD-MCNC: 0.63 % — SIGNIFICANT CHANGE UP
CK MB CFR SERPL CALC: 10.8 NG/ML — HIGH (ref 0.5–3.6)
CK SERPL-CCNC: 1303 U/L — HIGH (ref 39–308)
CK SERPL-CCNC: 1717 U/L — HIGH (ref 39–308)
CO2 SERPL-SCNC: 28 MMOL/L — SIGNIFICANT CHANGE UP (ref 22–31)
COLOR SPEC: YELLOW — SIGNIFICANT CHANGE UP
CREAT SERPL-MCNC: 1.08 MG/DL — SIGNIFICANT CHANGE UP (ref 0.5–1.3)
DIFF PNL FLD: NEGATIVE — SIGNIFICANT CHANGE UP
EGFR: 91 ML/MIN/1.73M2 — SIGNIFICANT CHANGE UP
EOSINOPHIL # BLD AUTO: 0.26 K/UL — SIGNIFICANT CHANGE UP (ref 0–0.5)
EOSINOPHIL NFR BLD AUTO: 3.5 % — SIGNIFICANT CHANGE UP (ref 0–6)
GLUCOSE SERPL-MCNC: 98 MG/DL — SIGNIFICANT CHANGE UP (ref 70–99)
GLUCOSE UR QL: NEGATIVE — SIGNIFICANT CHANGE UP
HCT VFR BLD CALC: 47.1 % — SIGNIFICANT CHANGE UP (ref 39–50)
HGB BLD-MCNC: 15.6 G/DL — SIGNIFICANT CHANGE UP (ref 13–17)
IMM GRANULOCYTES NFR BLD AUTO: 0.7 % — SIGNIFICANT CHANGE UP (ref 0–1.5)
KETONES UR-MCNC: NEGATIVE — SIGNIFICANT CHANGE UP
LACTATE SERPL-SCNC: 0.4 MMOL/L — SIGNIFICANT CHANGE UP (ref 0.4–2)
LEUKOCYTE ESTERASE UR-ACNC: NEGATIVE — SIGNIFICANT CHANGE UP
LYMPHOCYTES # BLD AUTO: 1.78 K/UL — SIGNIFICANT CHANGE UP (ref 1–3.3)
LYMPHOCYTES # BLD AUTO: 23.7 % — SIGNIFICANT CHANGE UP (ref 13–44)
MAGNESIUM SERPL-MCNC: 2.2 MG/DL — SIGNIFICANT CHANGE UP (ref 1.6–2.6)
MCHC RBC-ENTMCNC: 30.3 PG — SIGNIFICANT CHANGE UP (ref 27–34)
MCHC RBC-ENTMCNC: 33.1 GM/DL — SIGNIFICANT CHANGE UP (ref 32–36)
MCV RBC AUTO: 91.5 FL — SIGNIFICANT CHANGE UP (ref 80–100)
MONOCYTES # BLD AUTO: 0.52 K/UL — SIGNIFICANT CHANGE UP (ref 0–0.9)
MONOCYTES NFR BLD AUTO: 6.9 % — SIGNIFICANT CHANGE UP (ref 2–14)
NEUTROPHILS # BLD AUTO: 4.83 K/UL — SIGNIFICANT CHANGE UP (ref 1.8–7.4)
NEUTROPHILS NFR BLD AUTO: 64.4 % — SIGNIFICANT CHANGE UP (ref 43–77)
NITRITE UR-MCNC: NEGATIVE — SIGNIFICANT CHANGE UP
NRBC # BLD: 0 /100 WBCS — SIGNIFICANT CHANGE UP (ref 0–0)
NT-PROBNP SERPL-SCNC: 13 PG/ML — SIGNIFICANT CHANGE UP
PH UR: 6 — SIGNIFICANT CHANGE UP (ref 5–8)
PHOSPHATE SERPL-MCNC: 3.3 MG/DL — SIGNIFICANT CHANGE UP (ref 2.5–4.5)
PLATELET # BLD AUTO: 376 K/UL — SIGNIFICANT CHANGE UP (ref 150–400)
POTASSIUM SERPL-MCNC: 4.1 MMOL/L — SIGNIFICANT CHANGE UP (ref 3.5–5.3)
POTASSIUM SERPL-SCNC: 4.1 MMOL/L — SIGNIFICANT CHANGE UP (ref 3.5–5.3)
PROT SERPL-MCNC: 6.9 G/DL — SIGNIFICANT CHANGE UP (ref 6.4–8.2)
PROT UR-MCNC: NEGATIVE MG/DL — SIGNIFICANT CHANGE UP
RBC # BLD: 5.15 M/UL — SIGNIFICANT CHANGE UP (ref 4.2–5.8)
RBC # FLD: 13.8 % — SIGNIFICANT CHANGE UP (ref 10.3–14.5)
SODIUM SERPL-SCNC: 136 MMOL/L — SIGNIFICANT CHANGE UP (ref 132–145)
SP GR SPEC: <=1.005 — SIGNIFICANT CHANGE UP (ref 1–1.03)
T4 AB SER-ACNC: 5.2 UG/DL — SIGNIFICANT CHANGE UP (ref 4.6–12)
TROPONIN I, HIGH SENSITIVITY RESULT: 6.8 NG/L — SIGNIFICANT CHANGE UP
TSH SERPL-MCNC: 6.18 UIU/ML — HIGH (ref 0.36–3.74)
UROBILINOGEN FLD QL: 0.2 E.U./DL — SIGNIFICANT CHANGE UP
WBC # BLD: 7.5 K/UL — SIGNIFICANT CHANGE UP (ref 3.8–10.5)
WBC # FLD AUTO: 7.5 K/UL — SIGNIFICANT CHANGE UP (ref 3.8–10.5)

## 2022-08-02 PROCEDURE — 93010 ELECTROCARDIOGRAM REPORT: CPT

## 2022-08-02 PROCEDURE — 99218: CPT | Mod: 25

## 2022-08-02 RX ORDER — SODIUM CHLORIDE 9 MG/ML
1000 INJECTION INTRAMUSCULAR; INTRAVENOUS; SUBCUTANEOUS ONCE
Refills: 0 | Status: COMPLETED | OUTPATIENT
Start: 2022-08-02 | End: 2022-08-02

## 2022-08-02 RX ADMIN — SODIUM CHLORIDE 1000 MILLILITER(S): 9 INJECTION INTRAMUSCULAR; INTRAVENOUS; SUBCUTANEOUS at 13:15

## 2022-08-02 RX ADMIN — SODIUM CHLORIDE 1000 MILLILITER(S): 9 INJECTION INTRAMUSCULAR; INTRAVENOUS; SUBCUTANEOUS at 11:33

## 2022-08-02 RX ADMIN — SODIUM CHLORIDE 1000 MILLILITER(S): 9 INJECTION INTRAMUSCULAR; INTRAVENOUS; SUBCUTANEOUS at 14:46

## 2022-08-02 RX ADMIN — SODIUM CHLORIDE 1000 MILLILITER(S): 9 INJECTION INTRAMUSCULAR; INTRAVENOUS; SUBCUTANEOUS at 14:27

## 2022-08-02 NOTE — ED PROVIDER NOTE - NS ED ATTENDING STATEMENT MOD
This was a shared visit with the [facility]. I reviewed and verified the documentation and independently performed the documented:

## 2022-08-02 NOTE — ED CDU PROVIDER INITIAL DAY NOTE - CROS ED GU ALL NEG
-- DO NOT REPLY / DO NOT REPLY ALL --  -- Message is from the [facility]--    General Patient Message      Reason for Call: 72 hour disposition for fatigue for approx 1 week. Also having SOB with walking per urgent care. No avail appt with Dr Rodriguez in that time frame. Pt has appt on 4/30/22 with DR Rodriguez and would like an earlier one if avail. Please contact at [phone].    Caller Information       Type Contact Phone    04/22/2022 08:13 AM CDT Phone (Incoming) Sheryl Guo (Self) [phone] (M)          Alternative phone number:       Turnaround time given to caller:   \"This message will be sent to [state Provider's name]. The clinical team will fulfill your request as soon as they review your message.\"    
-- DO NOT REPLY / DO NOT REPLY ALL --  -- Message is from the [facility]--    Transfer to RN      Chief Complaint:  Patient is spitting up whit foam    Caller Information       Type Contact Phone    04/22/2022 08:13 AM CDT Phone (Incoming) Sheryl Guo (Self) [phone] (M)          Provider Name:  Umer BEATTY Practice Site Name:  [address]   Alternative Phone Number: none    
Called patient and discuss this matter with her already she stated she is awaiting Dr. Rodriguez call back from our call this morning.   
Sheryl, #[phone]    Onset: a couple of mornings to a week  Location / description: fatigued, having diff getting out of bed, dissipates when up and moving about  Precipitating Factors: does not know  Pain Scale (1-10), 10 highest: 0/10  Associated Symptoms: bleeding from rectum due to hemorrhoids which is chronic, spitting up white mucus in the sink this AM, short of breath with walking yesterday when went to urgent care, due to start PT on Monday, blood sugar 123 today  What improves / worsens symptoms: gets up and moves around, drinks a lot of water  Symptom specific medications: Xanax, Wellbutrin, Cymbalta,   LMP : No LMP recorded (lmp unknown). Patient has had a hysterectomy.  Are you pregnant or breast feeding:     Recent visits (last 3-4 weeks) for same reason or recent surgery: was in urgent care yesterday     PLAN:   See Provider within next few days   No avail appts with provider in that time frame. Pt has appt with provider on 4/30/22. Message to clinic.     Patient/Caller agrees to follow recommendations.    Reason for Disposition  • [1] Fatigue (i.e., tires easily, decreased energy) AND [2] persists > 1 week    Protocols used: WEAKNESS (GENERALIZED) AND FATIGUE-A-AH      
negative...

## 2022-08-02 NOTE — ED ADULT NURSE REASSESSMENT NOTE - NS ED NURSE REASSESS COMMENT FT1
MD gave verbal order for DC. patient unwilling to wait for DC paperwork or vital signs. was given results and told to drink water. Patient states understanding

## 2022-08-02 NOTE — ED CDU PROVIDER DISPOSITION NOTE - PATIENT PORTAL LINK FT
You can access the FollowMyHealth Patient Portal offered by [facility] by registering at the following website: [email or web address]. By joining [facility]’s FollowMyHealth portal, you will also be able to view your health information using other applications (apps) compatible with our system.

## 2022-08-02 NOTE — ED PROVIDER NOTE - NSICDXPASTSURGICALHX_GEN_ALL_CORE_FT
PAST SURGICAL HISTORY:  H/O thyroidectomy       Status post insertion of drug-eluting stent into left anterior descending (LAD) artery

## 2022-08-02 NOTE — ED PROVIDER NOTE - CLINICAL SUMMARY MEDICAL DECISION MAKING FREE TEXT BOX
pt presents with c/o abnormal labs with PMD. sent for rhabdo vs CAD. trop negative. ck elevated, likely 2/2 dehydration and power lifting.

## 2022-08-02 NOTE — ED PROVIDER NOTE - NSICDXPASTMEDICALHX_GEN_ALL_CORE_FT
PAST MEDICAL HISTORY:  CAD (coronary artery disease)     Drug abuse     MI (myocardial infarction)     Thyroid cancer

## 2022-08-02 NOTE — ED CDU PROVIDER DISPOSITION NOTE - NSFOLLOWUPINSTRUCTIONS_ED_ALL_ED_FT
Rhabdomyolysis    WHAT YOU NEED TO KNOW:    What is rhabdomyolysis? Rhabdomyolysis is a condition where injured muscles release harmful substances into the bloodstream. These substances include potassium, phosphate, creatinine kinase, and myoglobin. Large amounts of these substances may damage your kidneys and other organs.     What causes rhabdomyolysis?   •Conditions, such as seizures, severe asthma, and infections. Excessive vomiting or diarrhea, diabetes, or problems of hyperthyroidism (thyroid storm) may also injure your muscles.      •Temperature extremes, such as hyperthermia (very high body temperature) or hypothermia (very low body temperature).      •Extreme muscular activity, such as running marathons, can cause muscle stress and injury.      •Medicines or harmful substances, such as antidepressants or cholesterol medicine may injury your muscles. An overdose of aspirin or diuretics may cause an electrolyte imbalance and muscle injury. Alcohol and illegal drugs such as amphetamines, opiates, ecstasy, and LSD can also cause muscle injury.      •Trauma to the muscles, such as a crushing injury, electrical shock, or severe burns, can cause rhabdomyolysis.      What are the signs and symptoms of rhabdomyolysis?   •Pain, swelling, bruising, or weakness in your legs, arms, or lower back      •Dark-colored urine, blood in the urine, or passing little or no urine at all      •Fast heartbeat      •Confusion or easy irritation      •Nausea and vomiting      •Trouble breathing      How is rhabdomyolysis diagnosed?   •Blood and urine tests may show damage to your kidneys and liver. These tests may also show which substances are released by your injured muscles.       •A CT or MRI may show the muscle injuries or changes. You may be given contrast liquid to help the muscles show up better in the pictures. Tell the healthcare provider if you have ever had an allergic reaction to contrast liquid. Do not enter the MRI room with anything metal. Metal can cause serious injury. Tell the healthcare provider if you have any metal in or on your body.      •A biopsy from your muscle may show which substances are being released. This can help healthcare providers plan your treatment.      How is rhabdomyolysis treated?   •Large amounts of IV fluid help flush substances through your kidneys. Medicines may be added to the fluid to help flush out harmful substances and get rid of extra fluid. Medicines may also help reduce the acidity of your urine.      •Dialysis cleans your blood when your kidneys cannot. Extra water, chemicals, and waste products are removed from your blood by a dialyzer or dialysis machine. The dialysis machine does this by passing your blood through a special filter, then returning it back to you.      •A blood transfusion is when you are given whole or parts of blood through an IV. Blood is tested for diseases, such as hepatitis and HIV, to be sure it is safe.       •Fasciotomy is surgery to cut tissues that cover your muscles. This decreases pressure on blood vessels and nerves caused by swelling of the injured muscle.       How can I manage my symptoms?   •Drink liquids as directed. Ask how much liquid to drink each day and which liquids are best for you. Drink more liquids if you are doing strenuous work, exercise, and if it is warm outside. Liquids help flush substances from your body.      •Do not drink alcohol. Heavy alcohol use may increase your risk for rhabdomyolysis.      When should I contact my healthcare provider?   •You have questions or concerns about your condition or care.          When should I seek immediate care or call 911?   •Your urine is dark or tea-colored or has blood in it.      •You have pain, swelling, or weakness in your arms or legs that does not go away or gets worse.      •You are urinating less than usual or not able to urinate.      •You have chest pain.      •Your heart is beating faster than usual or has a strange rhythm.      CARE AGREEMENT:    You have the right to help plan your care. Learn about your health condition and how it may be treated. Discuss treatment options with your healthcare providers to decide what care you want to receive. You always have the right to refuse treatment.

## 2022-08-02 NOTE — ED ADULT NURSE NOTE - NSFALLRSKASSESSDT_ED_ALL_ED
Order has been placed. Please inform patient to be fasting 12 hours for this blood test.   02-Aug-2022 10:55

## 2022-08-02 NOTE — ED CDU PROVIDER INITIAL DAY NOTE - ATTENDING APP SHARED VISIT CONTRIBUTION OF CARE
36-year-old male presented to the ED with concern for rhabdo.  Patient had elevated CPK which decreased after fluids.  Patient has follow-up with for further cpk checks.  DC home strict return precautions instructions to drink lots of fluids.

## 2022-08-02 NOTE — ED PROVIDER NOTE - CONTEXT
Pt went to PMD on 7/22 for fatigue, sent by doctor for labs and concern for cardiac health vs rhabdo

## 2022-08-02 NOTE — ED CDU PROVIDER INITIAL DAY NOTE - OBJECTIVE STATEMENT
Pt is a 35 yo male with a history of MI, s/p stenting of the LAD with drug related cardiomyopathy and thyroid cancer s/p resection on Levothyroxine, also taking IM Testosterone for competitive weight lifting. Pt was seen by PMD on 7/22 where had screening lab done for symptoms of fatigue. Pt had labs significant for TSH of 15.8, CRP cardiac 10.14, ESR 24, CK of 1263 on 7/22. Pt seen again by PMD on 7/28 and had labs significant for , CK 1194, TSH of 10. Pt was seen by endocrinologist yesterday who repeated thyroid ultrasound which was "normal" as per patient. However he notes his endocrinologist is contemplating adjusting his Levothyroxine as Pt has been taking it with food with suspected lower bioavailability. Pt was sent by his PMD with concern for cardiac health vs. rhabdo. Pt denies any chest pain, SOB, extremity swelling, dizziness, urine changes. Pt notes he has been taking increased PO fluids with only minimal improvement with outpatient labs.

## 2022-08-02 NOTE — ED CDU PROVIDER INITIAL DAY NOTE - MEDICAL DECISION MAKING DETAILS
pt presents with suspected rhabdo but also h/o MI with elevated CK - sent from PMD for further eval. ekg wnl. trop negative. 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 improved after IVF.

## 2022-08-02 NOTE — ED PROVIDER NOTE - ATTENDING APP SHARED VISIT CONTRIBUTION OF CARE
36-year-old male with past medical history of MI status post stent, drug-eluting cardiomyopathy, thyroid cancer presents to ED for elevated CPK.  Patient is a competitive weightlifter and takes testosterone.  Patient saw his primary care doctor and had a CPK of 1263 on July 22 which was repeated on July 20 1194 at that time..  Patient's not had any muscle pain fever chills brown urine.    Const: NAD  Eyes: PERRL, no conjunctival injection  HENT:  Neck supple without meningismus   CV: RRR, Warm, well-perfused extremities  RESP: CTA B/L, no tachypnea   GI: soft, non-tender, non-distended  MSK: No gross deformities appreciated  Skin: Warm, dry. No rashes  Neuro: Alert, CNs II-XII grossly intact. Sensation and motor function of extremities grossly intact.  Psych: Appropriate mood and affect.    will check labs- cpk, chemistry

## 2022-08-02 NOTE — ED ADULT NURSE REASSESSMENT NOTE - NS ED NURSE REASSESS COMMENT FT1
Break coverage for radha mauricio introduced self to patient , iv fluids continue for repeat ck post fluid pt aware, currently walking out independantly to use bathroom , iv fluids on iv pole

## 2022-08-02 NOTE — ED PROVIDER NOTE - CHPI ED SYMPTOMS NEG
Laps, needles and instruments count was reported as correct.
no SOB, no extremity swelling, no dizziness, no urine changes/no chest pain

## 2022-08-02 NOTE — ED PROVIDER NOTE - OBJECTIVE STATEMENT
PIP= day 21 progesterone shows ovulation, but since she is not yet pregnant, we will increase her to Clomid 100 mg a month. 37 yo male with a history of MI, s/p stenting of the LAD with drug related cardiomyopathy and thyroid cancer s/p resection on Levothyroxine also taking IM Testosterone for competitive weight lifting. Pt was seem by primary care doctor on 7/22 where had screening lab done for symptoms of fatigue.Pt had labs significant for TSH of 15.8, CRP cardiac 10.14, ESR 24, CK of 1263 on 7/22. Pt seen again by PMD on 7/28 and had had labs significant for , CK 1194, TSH of 10. Pt was seen by endocronigist yesterday who repeated thyroid us who was "normal" as per patient. However he notes his endocronoloist compemplating increasing his Levothyroxine as he has been adjusting his Leothyroxine as pt has been taking it with food with suspected lower bioavailabitly. Pt was sent by his PMD with concern for cardiac health vs. rhabdo. 35 yo male with a history of MI, s/p stenting of the LAD with drug related cardiomyopathy and thyroid cancer s/p resection on Levothyroxine also taking IM Testosterone for competitive weight lifting. Pt was seem by primary care doctor on 7/22 where had screening lab done for symptoms of fatigue. Pt had labs significant for TSH of 15.8, CRP cardiac 10.14, ESR 24, CK of 1263 on 7/22. Pt seen again by PMD on 7/28 and had labs significant for , CK 1194, TSH of 10. Pt was seen by endocrinologist yesterday who repeated thyroid US who was "normal" as per patient. However he notes his endocrinologist contemplating increasing his Levothyroxine as he has been adjusting his Levothyroxine as Pt has been taking it with food with suspected lower bioavailability. Pt was sent by his PMD with concern for cardiac health vs. rhabdo. Notes he has been taking increased PO fluids with only minimal improvement with outpatient labs. 35 yo male with a history of MI, s/p stenting of the LAD with drug related cardiomyopathy and thyroid cancer s/p resection on Levothyroxine also taking IM Testosterone for competitive weight lifting. Pt was seem by primary care doctor on 7/22 where had screening lab done for symptoms of fatigue. Pt had labs significant for TSH of 15.8, CRP cardiac 10.14, ESR 24, CK of 1263 on 7/22. Pt seen again by PMD on 7/28 and had labs significant for , CK 1194, TSH of 10. Pt was seen by endocrinologist yesterday who repeated thyroid US who was "normal" as per patient. However he notes his endocrinologist contemplating increasing his Levothyroxine as he has been adjusting his Levothyroxine as Pt has been taking it with food with suspected lower bioavailability. Pt was sent by his PMD with concern for cardiac health vs. rhabdo. Pt denies any chest pain, SOB, extremity swelling, dizziness, urine changes. Pt notes he has been taking increased PO fluids with only minimal improvement with outpatient labs. Pt is a 37 yo male with a history of MI, s/p stenting of the LAD with drug related cardiomyopathy and thyroid cancer s/p resection on Levothyroxine also taking IM Testosterone for competitive weight lifting. Pt was seem by primary care doctor on 7/22 where had screening lab done for symptoms of fatigue. Pt had labs significant for TSH of 15.8, CRP cardiac 10.14, ESR 24, CK of 1263 on 7/22. Pt seen again by PMD on 7/28 and had labs significant for , CK 1194, TSH of 10. Pt was seen by endocrinologist yesterday who repeated thyroid US who was "normal" as per patient. However he notes his endocrinologist contemplating increasing his Levothyroxine as he has been adjusting his Levothyroxine as Pt has been taking it with food with suspected lower bioavailability. Pt was sent by his PMD with concern for cardiac health vs. rhabdo. Pt denies any chest pain, SOB, extremity swelling, dizziness, urine changes. Pt notes he has been taking increased PO fluids with only minimal improvement with outpatient labs. Pt is a 37 yo male with a history of MI, s/p stenting of the LAD with drug related cardiomyopathy and thyroid cancer s/p resection on Levothyroxine, also taking IM Testosterone for competitive weight lifting. Pt was seem by primary care doctor on 7/22 where had screening lab done for symptoms of fatigue. Pt had labs significant for TSH of 15.8, CRP cardiac 10.14, ESR 24, CK of 1263 on 7/22. Pt seen again by PMD on 7/28 and had labs significant for , CK 1194, TSH of 10. Pt was seen by endocrinologist yesterday who repeated thyroid ultrasound which was "normal" as per patient. However he notes his endocrinologist contemplating increasing his Levothyroxine, as he has been adjusting his Levothyroxine as Pt has been taking it with food with suspected lower bioavailability. Pt was sent by his PMD with concern for cardiac health vs. rhabdo. Pt denies any chest pain, SOB, extremity swelling, dizziness, urine changes. Pt notes he has been taking increased PO fluids with only minimal improvement with outpatient labs. Pt is a 35 yo male with a history of MI, s/p stenting of the LAD with drug related cardiomyopathy and thyroid cancer s/p resection on Levothyroxine, also taking IM Testosterone for competitive weight lifting. Pt was seen by PMD on 7/22 where had screening lab done for symptoms of fatigue. Pt had labs significant for TSH of 15.8, CRP cardiac 10.14, ESR 24, CK of 1263 on 7/22. Pt seen again by PMD on 7/28 and had labs significant for , CK 1194, TSH of 10. Pt was seen by endocrinologist yesterday who repeated thyroid ultrasound which was "normal" as per patient. However he notes his endocrinologist is contemplating adjusting his Levothyroxine as Pt has been taking it with food with suspected lower bioavailability. Pt was sent by his PMD with concern for cardiac health vs. rhabdo. Pt denies any chest pain, SOB, extremity swelling, dizziness, urine changes. Pt notes he has been taking increased PO fluids with only minimal improvement with outpatient labs.

## 2022-08-02 NOTE — ED PROVIDER NOTE - PROGRESS NOTE DETAILS
ML:  initiall [id number] which decreased to 1303 after fluids.  Patient instructed to stay hydrated and not exercise and get rechecked by his primary care doctor.

## 2022-08-02 NOTE — ED CDU PROVIDER DISPOSITION NOTE - CLINICAL COURSE
pt presents with c/o abnormal labs with PMD x 2 weeks. trop negative. CK improved with IVF. explained symptoms likely 2/2 testosterone and excessive exercise with dehydration. pt will drink fluids and understands he should not exercise for several days but admits he will unlikely follow this instruction. is not currently on testosterone. will f/u with PMD

## 2022-08-02 NOTE — ED CDU PROVIDER DISPOSITION NOTE - ATTENDING APP SHARED VISIT CONTRIBUTION OF CARE
36-year-old male with placed observation for evaluation of rhabdomyolysis.  Patient had CP cage drawn which improved with fluids.  Patient is asymptomatic at this time.  Patient has primary care doctor is able to follow-up with for CPK rechecks.  Instructed patient to drink lots of electrolyte solution and liquids.  Patient understands DC home.

## 2022-08-03 LAB
CULTURE RESULTS: NO GROWTH — SIGNIFICANT CHANGE UP
SPECIMEN SOURCE: SIGNIFICANT CHANGE UP

## 2022-12-11 ENCOUNTER — EMERGENCY (EMERGENCY)
Facility: HOSPITAL | Age: 37
LOS: 1 days | Discharge: HOME | End: 2022-12-11
Admitting: EMERGENCY MEDICINE

## 2022-12-11 VITALS
OXYGEN SATURATION: 99 % | HEART RATE: 88 BPM | DIASTOLIC BLOOD PRESSURE: 65 MMHG | SYSTOLIC BLOOD PRESSURE: 121 MMHG | RESPIRATION RATE: 17 BRPM

## 2022-12-11 VITALS
OXYGEN SATURATION: 99 % | SYSTOLIC BLOOD PRESSURE: 143 MMHG | RESPIRATION RATE: 19 BRPM | DIASTOLIC BLOOD PRESSURE: 85 MMHG | WEIGHT: 195.11 LBS | TEMPERATURE: 98 F | HEART RATE: 117 BPM

## 2022-12-11 DIAGNOSIS — Z20.822 CONTACT WITH AND (SUSPECTED) EXPOSURE TO COVID-19: ICD-10-CM

## 2022-12-11 DIAGNOSIS — N30.00 ACUTE CYSTITIS WITHOUT HEMATURIA: ICD-10-CM

## 2022-12-11 DIAGNOSIS — M54.50 LOW BACK PAIN, UNSPECIFIED: ICD-10-CM

## 2022-12-11 DIAGNOSIS — Z79.02 LONG TERM (CURRENT) USE OF ANTITHROMBOTICS/ANTIPLATELETS: ICD-10-CM

## 2022-12-11 DIAGNOSIS — Z79.82 LONG TERM (CURRENT) USE OF ASPIRIN: ICD-10-CM

## 2022-12-11 DIAGNOSIS — Z90.09 ACQUIRED ABSENCE OF OTHER PART OF HEAD AND NECK: Chronic | ICD-10-CM

## 2022-12-11 DIAGNOSIS — Z95.5 PRESENCE OF CORONARY ANGIOPLASTY IMPLANT AND GRAFT: Chronic | ICD-10-CM

## 2022-12-11 LAB
ALBUMIN SERPL ELPH-MCNC: 3.6 G/DL — SIGNIFICANT CHANGE UP (ref 3.4–5)
ALP SERPL-CCNC: 47 U/L — SIGNIFICANT CHANGE UP (ref 40–120)
ALT FLD-CCNC: 50 U/L — HIGH (ref 12–42)
ANION GAP SERPL CALC-SCNC: 6 MMOL/L — LOW (ref 9–16)
APPEARANCE UR: CLEAR — SIGNIFICANT CHANGE UP
AST SERPL-CCNC: 29 U/L — SIGNIFICANT CHANGE UP (ref 15–37)
BACTERIA # UR AUTO: PRESENT /HPF
BASOPHILS # BLD AUTO: 0.04 K/UL — SIGNIFICANT CHANGE UP (ref 0–0.2)
BASOPHILS NFR BLD AUTO: 0.3 % — SIGNIFICANT CHANGE UP (ref 0–2)
BILIRUB SERPL-MCNC: 0.6 MG/DL — SIGNIFICANT CHANGE UP (ref 0.2–1.2)
BILIRUB UR-MCNC: NEGATIVE — SIGNIFICANT CHANGE UP
BUN SERPL-MCNC: 30 MG/DL — HIGH (ref 7–23)
CALCIUM SERPL-MCNC: 9.3 MG/DL — SIGNIFICANT CHANGE UP (ref 8.5–10.5)
CHLORIDE SERPL-SCNC: 100 MMOL/L — SIGNIFICANT CHANGE UP (ref 96–108)
CK SERPL-CCNC: 508 U/L — HIGH (ref 39–308)
CO2 SERPL-SCNC: 29 MMOL/L — SIGNIFICANT CHANGE UP (ref 22–31)
COLOR SPEC: YELLOW — SIGNIFICANT CHANGE UP
CREAT SERPL-MCNC: 0.95 MG/DL — SIGNIFICANT CHANGE UP (ref 0.5–1.3)
DIFF PNL FLD: ABNORMAL
EGFR: 106 ML/MIN/1.73M2 — SIGNIFICANT CHANGE UP
EOSINOPHIL # BLD AUTO: 0 K/UL — SIGNIFICANT CHANGE UP (ref 0–0.5)
EOSINOPHIL NFR BLD AUTO: 0 % — SIGNIFICANT CHANGE UP (ref 0–6)
FLUAV AG NPH QL: SIGNIFICANT CHANGE UP
FLUBV AG NPH QL: SIGNIFICANT CHANGE UP
GLUCOSE SERPL-MCNC: 97 MG/DL — SIGNIFICANT CHANGE UP (ref 70–99)
GLUCOSE UR QL: NEGATIVE — SIGNIFICANT CHANGE UP
HCT VFR BLD CALC: 51.9 % — HIGH (ref 39–50)
HGB BLD-MCNC: 16.9 G/DL — SIGNIFICANT CHANGE UP (ref 13–17)
IMM GRANULOCYTES NFR BLD AUTO: 0.6 % — SIGNIFICANT CHANGE UP (ref 0–0.9)
KETONES UR-MCNC: NEGATIVE — SIGNIFICANT CHANGE UP
LEUKOCYTE ESTERASE UR-ACNC: NEGATIVE — SIGNIFICANT CHANGE UP
LYMPHOCYTES # BLD AUTO: 13.3 % — SIGNIFICANT CHANGE UP (ref 13–44)
LYMPHOCYTES # BLD AUTO: 2.07 K/UL — SIGNIFICANT CHANGE UP (ref 1–3.3)
MCHC RBC-ENTMCNC: 30.1 PG — SIGNIFICANT CHANGE UP (ref 27–34)
MCHC RBC-ENTMCNC: 32.6 GM/DL — SIGNIFICANT CHANGE UP (ref 32–36)
MCV RBC AUTO: 92.3 FL — SIGNIFICANT CHANGE UP (ref 80–100)
MONOCYTES # BLD AUTO: 0.99 K/UL — HIGH (ref 0–0.9)
MONOCYTES NFR BLD AUTO: 6.3 % — SIGNIFICANT CHANGE UP (ref 2–14)
NEUTROPHILS # BLD AUTO: 12.42 K/UL — HIGH (ref 1.8–7.4)
NEUTROPHILS NFR BLD AUTO: 79.5 % — HIGH (ref 43–77)
NITRITE UR-MCNC: POSITIVE
NRBC # BLD: 0 /100 WBCS — SIGNIFICANT CHANGE UP (ref 0–0)
PH UR: 6 — SIGNIFICANT CHANGE UP (ref 5–8)
PLATELET # BLD AUTO: 474 K/UL — HIGH (ref 150–400)
POTASSIUM SERPL-MCNC: 5.5 MMOL/L — HIGH (ref 3.5–5.3)
POTASSIUM SERPL-SCNC: 5.5 MMOL/L — HIGH (ref 3.5–5.3)
PROT SERPL-MCNC: 7.4 G/DL — SIGNIFICANT CHANGE UP (ref 6.4–8.2)
PROT UR-MCNC: 30 MG/DL
RBC # BLD: 5.62 M/UL — SIGNIFICANT CHANGE UP (ref 4.2–5.8)
RBC # FLD: 14.6 % — HIGH (ref 10.3–14.5)
RBC CASTS # UR COMP ASSIST: ABNORMAL /HPF
RSV RNA NPH QL NAA+NON-PROBE: SIGNIFICANT CHANGE UP
SARS-COV-2 RNA SPEC QL NAA+PROBE: SIGNIFICANT CHANGE UP
SODIUM SERPL-SCNC: 135 MMOL/L — SIGNIFICANT CHANGE UP (ref 132–145)
SP GR SPEC: 1.02 — SIGNIFICANT CHANGE UP (ref 1–1.03)
UROBILINOGEN FLD QL: 0.2 E.U./DL — SIGNIFICANT CHANGE UP
WBC # BLD: 15.61 K/UL — HIGH (ref 3.8–10.5)
WBC # FLD AUTO: 15.61 K/UL — HIGH (ref 3.8–10.5)
WBC UR QL: < 5 /HPF — SIGNIFICANT CHANGE UP

## 2022-12-11 PROCEDURE — 99285 EMERGENCY DEPT VISIT HI MDM: CPT

## 2022-12-11 PROCEDURE — 72131 CT LUMBAR SPINE W/O DYE: CPT | Mod: 26

## 2022-12-11 PROCEDURE — 74177 CT ABD & PELVIS W/CONTRAST: CPT | Mod: 26

## 2022-12-11 RX ORDER — METHOCARBAMOL 500 MG/1
1500 TABLET, FILM COATED ORAL ONCE
Refills: 0 | Status: COMPLETED | OUTPATIENT
Start: 2022-12-11 | End: 2022-12-11

## 2022-12-11 RX ORDER — MORPHINE SULFATE 50 MG/1
4 CAPSULE, EXTENDED RELEASE ORAL ONCE
Refills: 0 | Status: DISCONTINUED | OUTPATIENT
Start: 2022-12-11 | End: 2022-12-11

## 2022-12-11 RX ORDER — IBUPROFEN 200 MG
600 TABLET ORAL ONCE
Refills: 0 | Status: COMPLETED | OUTPATIENT
Start: 2022-12-11 | End: 2022-12-11

## 2022-12-11 RX ORDER — LIDOCAINE 4 G/100G
1 CREAM TOPICAL ONCE
Refills: 0 | Status: COMPLETED | OUTPATIENT
Start: 2022-12-11 | End: 2022-12-11

## 2022-12-11 RX ORDER — OXYCODONE HYDROCHLORIDE 5 MG/1
5 TABLET ORAL ONCE
Refills: 0 | Status: DISCONTINUED | OUTPATIENT
Start: 2022-12-11 | End: 2022-12-11

## 2022-12-11 RX ORDER — OXYCODONE AND ACETAMINOPHEN 5; 325 MG/1; MG/1
1 TABLET ORAL
Qty: 10 | Refills: 0
Start: 2022-12-11

## 2022-12-11 RX ORDER — LIDOCAINE 4 G/100G
1 CREAM TOPICAL
Qty: 12 | Refills: 0
Start: 2022-12-11

## 2022-12-11 RX ADMIN — METHOCARBAMOL 1500 MILLIGRAM(S): 500 TABLET, FILM COATED ORAL at 09:49

## 2022-12-11 RX ADMIN — OXYCODONE HYDROCHLORIDE 5 MILLIGRAM(S): 5 TABLET ORAL at 09:49

## 2022-12-11 RX ADMIN — Medication 600 MILLIGRAM(S): at 09:49

## 2022-12-11 RX ADMIN — Medication 1 TABLET(S): at 16:11

## 2022-12-11 RX ADMIN — MORPHINE SULFATE 4 MILLIGRAM(S): 50 CAPSULE, EXTENDED RELEASE ORAL at 12:52

## 2022-12-11 RX ADMIN — LIDOCAINE 1 PATCH: 4 CREAM TOPICAL at 09:50

## 2022-12-11 NOTE — ED ADULT NURSE NOTE - CHPI ED NUR SYMPTOMS NEG
no anorexia/no bladder dysfunction/no bowel dysfunction/no constipation/no fatigue/no motor function loss/no neck tenderness/no numbness

## 2022-12-11 NOTE — ED PROVIDER NOTE - OBJECTIVE STATEMENT
37-year-old male, past medical history of sciatica  treated with epidural steroid injections in the past presents to ED with lumbar back pain radiating down left leg x1 week.  3 days ago, a physician friend prescribed a Medrol Dosepak for his pain with no relief.  He states in the past, steroids usually help his pain significantly.  Patient states the pain is similar to prior episodes but more intense.  He took 975 mg of Tylenol prior to arrival.    Pt denies trauma/falls, fevers/chills, neck, headache, visual changes, sore throat, chest pain, palpitations, cough, SOB, abd pain, n/v/d, dysuria, hematuria, numbness of groin/genitalia, urine/stool incontinence or retention, weakness, dizziness, numbness, lower extremity swelling, rash, sick contacts, recent hospitalizations, recent travels, unintentional weight loss, ilicit drug use/IVDA.

## 2022-12-11 NOTE — ED PROVIDER NOTE - NS ED ROS FT
Constitutional:  no fever, no chills  Eyes:  no discharge, no irritations, no pain, no redness, visual changes  Ears:  no ear pain, no ear drainage,  no hearing problems  Nose:  no nasal congestion, no nasal drainage  Mouth/Throat:  no hoarseness and no throat pain  Neck:  no stiffness, no pain, no lumps, no swollen glands  Cardiac:  no chest pain, no edema  Respiratory:  no cough, no shortness of breath  GI: no abdominal pain, no bloating, no constipation, no diarrhea, no nausea, no vomiting  :  no dysuria, no urinary frequency, no hematuria, no discharge  MSK:  + back pain radiating down L leg, no msk pain, no weakness  NEURO:  no headache, no weakness, no numbness  Skin:  no lesions, no pruritis, no jaundice, no bruising, no rash  Psych:  no known mental health issues  Endocrine:  no diabetes, no thyroid issues

## 2022-12-11 NOTE — ED ADULT TRIAGE NOTE - CHIEF COMPLAINT QUOTE
here for left lower back pain radiating down left leg since last week. Pt taking steroids and took 975 of tylenol with little to no relief.

## 2022-12-11 NOTE — ED PROVIDER NOTE - CLINICAL SUMMARY MEDICAL DECISION MAKING FREE TEXT BOX
37-year-old male presents to the emergency department complaining of acute on chronic left-sided back pain.  Patient well-appearing, tachycardia 110s at triage, normotensive, afebrile.  Patient appears uncomfortable and prefers a standing.  Patient given oral medications with no relief.   WBC 15, likely secondary to steroid Rx. IV morphine given.  CT lumbar spine CT abdomen pelvis shows severe lumbar stenosis of L4L5.  Results d/w pt.      Results and diagnosis discussed with patient.  Treatment plan discussed: continue steroid, add ibuprofen, percocet, Lidoderm patches.  Pt advised to decrease dose of Tylenol to 650 mg.  Bactrim Rx for UTI. Return precautions discussed.  Pt verbalized understanding and given the opportunity to ask questions.  Patient advised to follow up with primary care provider. 37-year-old male presents to the emergency department complaining of acute on chronic left-sided back pain.  Patient well-appearing, tachycardia 110s at triage, normotensive, afebrile.  Patient appears uncomfortable and prefers a standing.  Patient given oral medications with no relief.   WBC 15, likely secondary to steroid Rx. IV morphine given.  CT lumbar spine CT abdomen pelvis shows severe lumbar stenosis of L4L5.  Results and incidental results d/w pt.      Results and diagnosis discussed with patient.  Treatment plan discussed: continue steroid, add ibuprofen, percocet, Lidoderm patches.  Pt advised to decrease dose of Tylenol to 650 mg.  Bactrim Rx for UTI. Return precautions discussed.  Pt verbalized understanding and given the opportunity to ask questions.  Patient advised to follow up with primary care provider.

## 2022-12-18 NOTE — ED ADULT TRIAGE NOTE - ARRIVAL FROM
Released with all follow-up. To  RX as directed. Encouraged to return with any changes or concerns or see PCp in follow-up if not improving.   Home

## 2023-04-27 NOTE — ED ADULT NURSE NOTE - TEMPLATE
[address] [facility] [id number]    Wound Management Visit    Date: 2023  Patient: Julio Varghese  MRN: [id number]  : 1948    Reason for Visit:  Julio Varghese is here today for a dressing change and a wound check.  He is accompanied by no one.    Referred by:  Patient is referred by     HPI:  Patient presents with wrap on right LE and is donning stocking on left le.     Physical Exam:  Wound  Location: Right posterior   Size: 0.5x1.0x0.1cm  Exudate: moderate, sero-sanguinous  Wound Edges and Periwound Tissue: hyperkeratotic  Edema: controlled by compression  Wound Bed Description: red 100%  Tissue Type: granulation  Odor: none  Circumferences: n/a      Physical Exam    Wound Treatment:  Removed dressing and washed leg with soap and water  Cleansed with saline   Applied Triamcinolone ointment 0.1% to right LE  Alginate with AG  Unna Boot  Compression Bandage short stretch coflex to right LE  Compression stocking on left LE    Instructions:  Keep wrap clean and dry     Assessment and Plan:  1. Open wound      Open wound  New wound to right LE. Will continue weekly compression until closed and able to transition into knee high stocking on right. Continues to wear stocking on left however had bandaid in place on right medial LE d/t skin being pulled. Currently closed. Continue stocking use.             Return in about 1 week (around 2023).    Samanta Banks RN  2023 11:28 AM        
Cold/Sinus

## 2023-09-28 NOTE — ED PROVIDER NOTE - NS ED MD DISPO DISCHARGE CCDA
PLAN:  Follow up as directed.    Additional Educational Resources:  For additional resources regarding your symptoms, diagnosis, or further health information, please visit the Online Health Resources section in MyChart.  
Patient/Caregiver provided printed discharge information.

## 2023-10-17 NOTE — ED CDU PROVIDER INITIAL DAY NOTE - ENMT, MLM
No Airway patent. Nasal mucosa clear. Mouth with normal mucosa. Throat has no vesicles, no oropharyngeal exudates and uvula is midline.
